# Patient Record
Sex: MALE | Race: WHITE | Employment: OTHER | URBAN - NONMETROPOLITAN AREA
[De-identification: names, ages, dates, MRNs, and addresses within clinical notes are randomized per-mention and may not be internally consistent; named-entity substitution may affect disease eponyms.]

---

## 2017-06-28 ENCOUNTER — OFFICE VISIT (OUTPATIENT)
Dept: VASCULAR SURGERY | Age: 67
End: 2017-06-28
Payer: MEDICARE

## 2017-06-28 ENCOUNTER — HOSPITAL ENCOUNTER (OUTPATIENT)
Dept: VASCULAR LAB | Age: 67
Discharge: HOME | End: 2017-06-28
Payer: MEDICARE

## 2017-06-28 VITALS
HEART RATE: 65 BPM | DIASTOLIC BLOOD PRESSURE: 70 MMHG | SYSTOLIC BLOOD PRESSURE: 118 MMHG | RESPIRATION RATE: 18 BRPM | OXYGEN SATURATION: 95 % | TEMPERATURE: 97.1 F

## 2017-06-28 DIAGNOSIS — I65.23 BILATERAL CAROTID ARTERY STENOSIS: Primary | ICD-10-CM

## 2017-06-28 DIAGNOSIS — I65.23 BILATERAL CAROTID ARTERY STENOSIS: ICD-10-CM

## 2017-06-28 PROCEDURE — 4040F PNEUMOC VAC/ADMIN/RCVD: CPT | Performed by: PHYSICIAN ASSISTANT

## 2017-06-28 PROCEDURE — 1123F ACP DISCUSS/DSCN MKR DOCD: CPT | Performed by: PHYSICIAN ASSISTANT

## 2017-06-28 PROCEDURE — G8421 BMI NOT CALCULATED: HCPCS | Performed by: PHYSICIAN ASSISTANT

## 2017-06-28 PROCEDURE — G8427 DOCREV CUR MEDS BY ELIG CLIN: HCPCS | Performed by: PHYSICIAN ASSISTANT

## 2017-06-28 PROCEDURE — 99213 OFFICE O/P EST LOW 20 MIN: CPT | Performed by: PHYSICIAN ASSISTANT

## 2017-06-28 PROCEDURE — 93880 EXTRACRANIAL BILAT STUDY: CPT

## 2017-06-28 PROCEDURE — 1036F TOBACCO NON-USER: CPT | Performed by: PHYSICIAN ASSISTANT

## 2017-06-28 PROCEDURE — G8598 ASA/ANTIPLAT THER USED: HCPCS | Performed by: PHYSICIAN ASSISTANT

## 2017-06-28 PROCEDURE — 3017F COLORECTAL CA SCREEN DOC REV: CPT | Performed by: PHYSICIAN ASSISTANT

## 2017-08-02 ENCOUNTER — TRANSCRIBE ORDERS (OUTPATIENT)
Dept: ADMINISTRATIVE | Facility: HOSPITAL | Age: 67
End: 2017-08-02

## 2017-08-02 DIAGNOSIS — J98.4 OTHER DISEASES OF LUNG, NOT ELSEWHERE CLASSIFIED: Primary | ICD-10-CM

## 2017-08-04 ENCOUNTER — HOSPITAL ENCOUNTER (OUTPATIENT)
Dept: CT IMAGING | Facility: HOSPITAL | Age: 67
Discharge: HOME | End: 2017-08-04
Attending: INTERNAL MEDICINE | Admitting: INTERNAL MEDICINE

## 2017-08-04 DIAGNOSIS — J98.4 OTHER DISEASES OF LUNG, NOT ELSEWHERE CLASSIFIED: ICD-10-CM

## 2017-08-04 PROCEDURE — 71250 CT THORAX DX C-: CPT

## 2018-07-20 DIAGNOSIS — I65.23 BILATERAL CAROTID ARTERY STENOSIS: Primary | ICD-10-CM

## 2018-07-23 ENCOUNTER — HOSPITAL ENCOUNTER (OUTPATIENT)
Dept: VASCULAR LAB | Age: 68
Discharge: HOME | End: 2018-07-23
Payer: MEDICARE

## 2018-07-23 DIAGNOSIS — I65.23 BILATERAL CAROTID ARTERY STENOSIS: ICD-10-CM

## 2018-07-23 PROCEDURE — 93880 EXTRACRANIAL BILAT STUDY: CPT

## 2018-07-24 ENCOUNTER — TELEPHONE (OUTPATIENT)
Dept: VASCULAR SURGERY | Age: 68
End: 2018-07-24

## 2018-07-24 DIAGNOSIS — I65.23 BILATERAL CAROTID ARTERY STENOSIS: Primary | ICD-10-CM

## 2018-07-24 NOTE — TELEPHONE ENCOUNTER
----- Message from Jc Jose PA-C sent at 7/24/2018  9:13 AM CDT -----  CDU unchanged, both ICA's < 50% stenotic. Repeat CDU in 12 months.

## 2019-06-13 ENCOUNTER — TRANSCRIBE ORDERS (OUTPATIENT)
Dept: PULMONOLOGY | Facility: CLINIC | Age: 69
End: 2019-06-13

## 2019-06-13 DIAGNOSIS — J98.4 SCARRING OF LUNG: Primary | ICD-10-CM

## 2019-07-31 ENCOUNTER — HOSPITAL ENCOUNTER (OUTPATIENT)
Dept: VASCULAR LAB | Age: 69
Discharge: HOME | End: 2019-07-31
Payer: MEDICARE

## 2019-07-31 ENCOUNTER — TELEPHONE (OUTPATIENT)
Dept: VASCULAR SURGERY | Age: 69
End: 2019-07-31

## 2019-07-31 DIAGNOSIS — I65.23 BILATERAL CAROTID ARTERY STENOSIS: ICD-10-CM

## 2019-07-31 DIAGNOSIS — I65.23 BILATERAL CAROTID ARTERY STENOSIS: Primary | ICD-10-CM

## 2019-07-31 PROCEDURE — 93880 EXTRACRANIAL BILAT STUDY: CPT

## 2019-07-31 NOTE — TELEPHONE ENCOUNTER
Called and spoke with Mr Ailyn Trivedi to let him know that carotid had not changed and we would see him back in one year.   Mr Ailyn Trivedi voiced understanding

## 2019-08-08 DIAGNOSIS — J98.4 SCARRING OF LUNG: ICD-10-CM

## 2019-08-22 ENCOUNTER — OFFICE VISIT (OUTPATIENT)
Dept: PULMONOLOGY | Facility: CLINIC | Age: 69
End: 2019-08-22

## 2019-08-22 VITALS
BODY MASS INDEX: 30.91 KG/M2 | OXYGEN SATURATION: 98 % | DIASTOLIC BLOOD PRESSURE: 80 MMHG | SYSTOLIC BLOOD PRESSURE: 124 MMHG | HEART RATE: 57 BPM | HEIGHT: 71 IN | WEIGHT: 220.8 LBS

## 2019-08-22 DIAGNOSIS — J98.4 SCARRING OF LUNG: Primary | ICD-10-CM

## 2019-08-22 DIAGNOSIS — E66.3 OVERWEIGHT: ICD-10-CM

## 2019-08-22 DIAGNOSIS — J98.4 RESTRICTIVE LUNG DISEASE: ICD-10-CM

## 2019-08-22 LAB
DIFF CAP.CO: NORMAL ML/MMHG SEC
FEV1/FVC: NORMAL %
FEV1: NORMAL LITERS
FVC VOL RESPIRATORY: NORMAL LITERS
TLC: NORMAL LITERS

## 2019-08-22 PROCEDURE — 94729 DIFFUSING CAPACITY: CPT | Performed by: INTERNAL MEDICINE

## 2019-08-22 PROCEDURE — 99214 OFFICE O/P EST MOD 30 MIN: CPT | Performed by: INTERNAL MEDICINE

## 2019-08-22 PROCEDURE — 94010 BREATHING CAPACITY TEST: CPT | Performed by: INTERNAL MEDICINE

## 2019-08-22 PROCEDURE — 94727 GAS DIL/WSHOT DETER LNG VOL: CPT | Performed by: INTERNAL MEDICINE

## 2019-08-22 RX ORDER — GLIPIZIDE 5 MG/1
TABLET ORAL EVERY 12 HOURS SCHEDULED
COMMUNITY

## 2019-08-22 RX ORDER — ACETAMINOPHEN 500 MG
500 TABLET ORAL
COMMUNITY

## 2019-08-22 RX ORDER — CHOLECALCIFEROL (VITAMIN D3) 25 MCG
CAPSULE ORAL
COMMUNITY

## 2019-08-22 RX ORDER — LEVOTHYROXINE SODIUM 0.07 MG/1
75 TABLET ORAL DAILY
Refills: 4 | COMMUNITY
Start: 2019-08-01

## 2019-08-22 RX ORDER — METOPROLOL TARTRATE 50 MG/1
TABLET, FILM COATED ORAL 2 TIMES DAILY
COMMUNITY

## 2019-08-22 NOTE — PATIENT INSTRUCTIONS
The patient is stable clinically and I advised the patient his wife that his CT was stable as well as were his pulmonary functions.  We will see him back in follow-up in 1 year with complete PFTs on return.  I do not think we need to do a routine follow-up CT.  If he had worsening symptoms worsening pulmonary functions in the future then we could get a follow-up CT.  I do suspect his lung scarring relates to previous environmental exposure.

## 2020-08-04 ENCOUNTER — HOSPITAL ENCOUNTER (OUTPATIENT)
Dept: VASCULAR LAB | Age: 70
Discharge: HOME | End: 2020-08-04
Payer: MEDICARE

## 2020-08-04 PROCEDURE — 93880 EXTRACRANIAL BILAT STUDY: CPT

## 2020-08-05 ENCOUNTER — VIRTUAL VISIT (OUTPATIENT)
Dept: VASCULAR SURGERY | Age: 70
End: 2020-08-05
Payer: MEDICARE

## 2020-08-05 PROCEDURE — 99441 PR PHYS/QHP TELEPHONE EVALUATION 5-10 MIN: CPT | Performed by: PHYSICIAN ASSISTANT

## 2020-08-05 NOTE — PROGRESS NOTES
by mouth daily       No current facility-administered medications for this visit. Allergies: Hydrocodone-acetaminophen and Tape [adhesive tape]  Past Medical History:   Diagnosis Date    Acute pain of right hip     Arthritis     Cancer ([facility] [address] 1998    MELANOMA REMOVED FROM LYMPH NODE IN RIGHT JAW    Carotid artery stenosis     Diabetes ([facility] [address]     TYPE 2    Diabetes mellitus ([facility] [address]     Hyperlipidemia     Hypertension     Osteoarthritis     Restless legs syndrome     Thyroid disease      Past Surgical History:   Procedure Laterality Date    BACK SURGERY  2013    LUMBAR/LASER CLEAN UP RUPTURED DISC    CAROTID ENDARTERECTOMY Right 8/5/15 P[facility]    JOINT REPLACEMENT Left 2010    THR    OTHER SURGICAL HISTORY      radiation and neck dissection for melanoma    TOTAL HIP ARTHROPLASTY Right 8/22/2016    HIP TOTAL ARTHROPLASTY ANTERIOR APPROACH performed by Fidencio Young [facility] at [facility] OR     Family History   Problem Relation Age of Onset    No Known Problems Mother     No Known Problems Father     Cancer Brother         LUNG CA    Mental Retardation Brother         PARANOID SCHIZOPHRENIA     Social History     Tobacco Use    Smoking status: Never Smoker   Substance Use Topics    Alcohol use: No     Comment: HX OF ETOH ABUSE       Mr. Sean Huitron is a 70 yo female who has a history of Carotid Artery Stenosis. Today we are following up for his carotid artery stenosis. He is on ASA and fish oil. He reports that he has tried Lipitor in the past and had to stop it due to myalgias. Patient denies any new onset of partial or complete loss of vision affecting only one eye, speech difficulty or lateralizing weakness, numbness/tingling. He does not smoke.        CDU -   Impression          Well healed right carotid endarterectomy site with no recurrent stenosis     or residual plaque.     There is heterogeneous plaque visualized in the left internal carotid     artery(ies).     There is less than 50% stenosis of the left the right side.           - Additional Measurements:ICAPSV/CCAPSV 0.75. ICAEDV/CCAEDV 1.08.         Carotid Left Measurements    +------------+-------+-------+--------+-------+------------+---------------+    ! Location    !PSV    !EDV    ! Angle   !RI     !%Stenosis   ! Tortuosity     !    +------------+-------+-------+--------+-------+------------+---------------+    ! Prox CCA    !62.1   !12.9   !60      !0.79   !            !               !    +------------+-------+-------+--------+-------+------------+---------------+    ! Mid CCA     !79.2   !17.6   !60      !0.78   !            !               !    +------------+-------+-------+--------+-------+------------+---------------+    ! Prox ICA    !86.2   !28.1   !60      !0.67   !            !               !    +------------+-------+-------+--------+-------+------------+---------------+    ! Mid ICA     !111    !34     !60      !0.69   !            !               !    +------------+-------+-------+--------+-------+------------+---------------+    ! Dist ICA    !74.5   !28.1   !60      !0.62   !            !               !    +------------+-------+-------+--------+-------+------------+---------------+    ! Prox ECA    !94.4   !8.21   !60      !0.91   !            !               !    +------------+-------+-------+--------+-------+------------+---------------+    ! Vertebral   !49.3   !13.1   !60      !0.73   !            !               !    +------------+-------+-------+--------+-------+------------+---------------+           - There is antegrade vertebral flow noted on the left side.           - Additional Measurements:ICAPSV/CCAPSV 1.79. ICAEDV/CCAEDV 2.64.             I affirm this is a Patient Initiated Episode with a Patient who has not had a related appointment within my department in the past 7 days or scheduled within the next 24 hours.     Patient identification was verified at the start of the visit: Yes    Total Time: minutes: 5-10 minutes    Note: not billable if this call serves to triage the patient into an appointment for the relevant concern      Agustin Alejo

## 2020-09-02 NOTE — PROGRESS NOTES
JUAN MANUEL Childers  [facility]   Respiratory Disease Clinic  [address] [place] [address]  Phone: [phone]  Fax: [phone]     Eric Maloney is a 70 y.o. male.   CC:   Chief Complaint   Patient presents with   • Scarring of lung     no hospitalizations; no exposure; no testing        HPI:  Mr. Maloney is a pleasant 70 year old male with known scarring of lung, restrictive lung disease, and asbestos exposure. He also [place]. When seen by Dr. Stanford last year, he was not found to have an IPF picture on previous imaging. He does have thickening of the fissures noted on previous scans. Last CT last year did not show any progression. He was advised he could use his inhalers as needed if they help. He is not currently using  Inhalers.He was sked to return in one year with repeat complete PFTs however given the pandemic, those have not been performed.  He is a former smoker of quitting 40 years ago.      Patient denies fever, chills, cough, shortness of breath or difficulty breathing, fatigue, muscle or body aches, new onset headache, new loss of taste or smell, sore throat, congestion or runny nose, nausea or vomiting, diarrhea.  Patient denies any known exposure to a person under investigation or positive for COVID-19.  Patient is wearing mask today.       The following portions of the patient's history were reviewed and updated as appropriate: allergies, current medications, past family history, past medical history, past social history, past surgical history and problem list.    Past Medical History:   Diagnosis Date   • Carotid artery stenosis    • Diabetes mellitus (CMS/HCC)    • Disease of thyroid gland    • Parotid neoplasm     right   • Skin cancer        Family History   Problem Relation Age of Onset   • Heart disease Other    • Hypertension Other    • Cancer Other    • Diabetes Other        Social History     Socioeconomic History   • Marital status:   "    Spouse name: Not on file   • Number of children: Not on file   • Years of education: Not on file   • Highest education level: Not on file   Tobacco Use   • Smoking status: Former Smoker     Packs/day: 2.00     Years: 3.00     Pack years: 6.00     Types: Cigarettes     Last attempt to quit: 1970     Years since quittin.7   • Smokeless tobacco: Never Used   Substance and Sexual Activity   • Alcohol use: No     Frequency: Never   • Drug use: Defer   • Sexual activity: Defer       Review of Systems   Constitutional: Negative for chills, fatigue and fever.   HENT: Negative for congestion, postnasal drip and sore throat.    Eyes: Negative for blurred vision, double vision and visual disturbance.   Respiratory: Negative for cough, shortness of breath and wheezing.    Cardiovascular: Negative for chest pain, palpitations and leg swelling.   Gastrointestinal: Negative for diarrhea, nausea and vomiting.   Endocrine: Negative for cold intolerance, heat intolerance and polydipsia.   Musculoskeletal: Negative for back pain, gait problem and myalgias.   Skin: Negative for color change, pallor and rash.   Allergic/Immunologic: Negative for environmental allergies, food allergies and immunocompromised state.   Neurological: Negative for dizziness, syncope and confusion.   Hematological: Negative for adenopathy. Does not bruise/bleed easily.   Psychiatric/Behavioral: Negative for agitation, behavioral problems and sleep disturbance.       /80   Pulse 58   Temp 98.1 °F (36.7 °C)   Ht 180.3 cm (71\")   Wt 107 kg (235 lb)   SpO2 97% Comment: RA  BMI 32.78 kg/m²     Physical Exam   Constitutional: He is oriented to person, place, and time. He appears well-developed and well-nourished. No distress.   HENT:   Head: Normocephalic and atraumatic.   Eyes: Pupils are equal, round, and reactive to light. Conjunctivae are normal.   Neck: Normal range of motion. Neck supple.   Cardiovascular: Normal rate, regular rhythm and " normal heart sounds. Exam reveals no gallop and no friction rub.   No murmur heard.  Pulmonary/Chest: Effort normal and breath sounds normal. He has no wheezes.   Abdominal: Soft. Bowel sounds are normal.   Musculoskeletal: He exhibits no edema or deformity.   Lymphadenopathy:     He has no cervical adenopathy.   Neurological: He is oriented to person, place, and time.   Skin: Skin is warm and dry. He is not diaphoretic.   Psychiatric: He has a normal mood and affect. His behavior is normal. Judgment and thought content normal.       Pulmonary Functions Testing Results:    PFT Values        Some values may be hidden. Unless noted otherwise, only the newest values recorded on each date are displayed.         Old Values PFT Results 8/22/19   FVC 78%   FEV1 75%   FEV1/FVC 76.04%   DLCO 86%   TLC 76%      Pre Drug PFT Results 8/22/19   No data to display.      Post Drug PFT Results 8/22/19   No data to display.      Other Tests PFT Results 8/22/19   No data to display.           Results for orders placed in visit on 08/22/19   Pulmonary Function Test       My interpretation: none    CXR: none        Problem List Items Addressed This Visit        Respiratory    Scarring of lung - Primary    Restrictive lung disease       Other    Overweight        Patient's Body mass index is 32.78 kg/m². BMI is above normal parameters. Recommendations include: referral to primary care.      Assessment/Plan        Patient is stable from a pulmonary standpoint. He will follow up with Dr. Stanford in one year.     Steffanie Garrett, JUAN MANUEL  9/4/2020  14:25    No follow-ups on file.    This dictation was generated by voice recognition computer software. Although all attempts are made to edit dictation for accuracy, there may be errors in the transcription that are not intended.

## 2020-09-04 ENCOUNTER — OFFICE VISIT (OUTPATIENT)
Dept: PULMONOLOGY | Facility: CLINIC | Age: 70
End: 2020-09-04

## 2020-09-04 VITALS
BODY MASS INDEX: 32.9 KG/M2 | TEMPERATURE: 98.1 F | WEIGHT: 235 LBS | SYSTOLIC BLOOD PRESSURE: 150 MMHG | DIASTOLIC BLOOD PRESSURE: 80 MMHG | HEIGHT: 71 IN | HEART RATE: 58 BPM | OXYGEN SATURATION: 97 %

## 2020-09-04 DIAGNOSIS — J98.4 RESTRICTIVE LUNG DISEASE: ICD-10-CM

## 2020-09-04 DIAGNOSIS — E66.3 OVERWEIGHT: ICD-10-CM

## 2020-09-04 DIAGNOSIS — J98.4 SCARRING OF LUNG: Primary | ICD-10-CM

## 2020-09-04 PROCEDURE — 99213 OFFICE O/P EST LOW 20 MIN: CPT | Performed by: NURSE PRACTITIONER

## 2020-09-04 RX ORDER — PRAVASTATIN SODIUM 40 MG
TABLET ORAL
COMMUNITY

## 2020-11-03 PROBLEM — M19.90 OSTEOARTHRITIS: Status: RESOLVED | Noted: 2020-11-03 | Resolved: 2020-11-03

## 2021-03-05 ENCOUNTER — IMMUNIZATION (OUTPATIENT)
Age: 71
End: 2021-03-05
Payer: MEDICARE

## 2021-03-05 PROCEDURE — 91300 COVID-19, PFIZER VACCINE 30MCG/0.3ML DOSE: CPT | Performed by: FAMILY MEDICINE

## 2021-03-05 PROCEDURE — 0001A COVID-19, PFIZER VACCINE 30MCG/0.3ML DOSE: CPT | Performed by: FAMILY MEDICINE

## 2021-03-26 ENCOUNTER — IMMUNIZATION (OUTPATIENT)
Age: 71
End: 2021-03-26
Payer: MEDICARE

## 2021-03-26 PROCEDURE — 91300 COVID-19, PFIZER VACCINE 30MCG/0.3ML DOSE: CPT | Performed by: FAMILY MEDICINE

## 2021-03-26 PROCEDURE — 0002A PR IMM ADMN SARSCOV2 30MCG/0.3ML DIL RECON 2ND DOSE: CPT | Performed by: FAMILY MEDICINE

## 2021-04-15 ENCOUNTER — HOSPITAL ENCOUNTER (OUTPATIENT)
Dept: PREADMISSION TESTING | Age: 71
Discharge: HOME | End: 2021-04-19
Payer: MEDICARE

## 2021-04-15 ENCOUNTER — HOSPITAL ENCOUNTER (OUTPATIENT)
Dept: GENERAL RADIOLOGY | Age: 71
Discharge: HOME | End: 2021-04-15
Payer: MEDICARE

## 2021-04-15 VITALS — WEIGHT: 238 LBS | BODY MASS INDEX: 33.32 KG/M2 | HEIGHT: 71 IN

## 2021-04-15 LAB
ABO/RH: NORMAL
ALBUMIN SERPL-MCNC: 4.1 G/DL (ref 3.5–5.2)
ALP BLD-CCNC: 51 U/L (ref 40–130)
ALT SERPL-CCNC: 21 U/L (ref 5–41)
ANION GAP SERPL CALCULATED.3IONS-SCNC: 9 MMOL/L (ref 7–19)
ANTIBODY SCREEN: NORMAL
APTT: 26.6 SEC (ref 26–36.2)
AST SERPL-CCNC: 21 U/L (ref 5–40)
BASOPHILS ABSOLUTE: 0.1 K/UL (ref 0–0.2)
BASOPHILS RELATIVE PERCENT: 0.8 % (ref 0–1)
BILIRUB SERPL-MCNC: 0.4 MG/DL (ref 0.2–1.2)
BILIRUBIN URINE: NEGATIVE
BLOOD, URINE: NEGATIVE
BUN BLDV-MCNC: 12 MG/DL (ref 8–23)
CALCIUM SERPL-MCNC: 8.9 MG/DL (ref 8.8–10.2)
CHLORIDE BLD-SCNC: 104 MMOL/L (ref 98–111)
CLARITY: CLEAR
CO2: 28 MMOL/L (ref 22–29)
COLOR: YELLOW
CREAT SERPL-MCNC: 0.6 MG/DL (ref 0.5–1.2)
EOSINOPHILS ABSOLUTE: 0.5 K/UL (ref 0–0.6)
EOSINOPHILS RELATIVE PERCENT: 8.6 % (ref 0–5)
GFR AFRICAN AMERICAN: >59
GFR NON-AFRICAN AMERICAN: >60
GLUCOSE BLD-MCNC: 146 MG/DL (ref 74–109)
GLUCOSE URINE: NEGATIVE MG/DL
HBA1C MFR BLD: 5.7 % (ref 4–6)
HCT VFR BLD CALC: 45.9 % (ref 42–52)
HEMOGLOBIN: 15 G/DL (ref 14–18)
IMMATURE GRANULOCYTES #: 0 K/UL
INR BLD: 1.04 (ref 0.88–1.18)
KETONES, URINE: NEGATIVE MG/DL
LEUKOCYTE ESTERASE, URINE: NEGATIVE
LYMPHOCYTES ABSOLUTE: 2 K/UL (ref 1.1–4.5)
LYMPHOCYTES RELATIVE PERCENT: 33.4 % (ref 20–40)
MCH RBC QN AUTO: 33.9 PG (ref 27–31)
MCHC RBC AUTO-ENTMCNC: 32.7 G/DL (ref 33–37)
MCV RBC AUTO: 103.6 FL (ref 80–94)
MONOCYTES ABSOLUTE: 0.7 K/UL (ref 0–0.9)
MONOCYTES RELATIVE PERCENT: 11 % (ref 0–10)
MRSA SCREEN RT-PCR: NOT DETECTED
NEUTROPHILS ABSOLUTE: 2.7 K/UL (ref 1.5–7.5)
NEUTROPHILS RELATIVE PERCENT: 45.7 % (ref 50–65)
NITRITE, URINE: NEGATIVE
PDW BLD-RTO: 12.7 % (ref 11.5–14.5)
PH UA: 6.5 (ref 5–8)
PLATELET # BLD: 164 K/UL (ref 130–400)
PMV BLD AUTO: 11.4 FL (ref 9.4–12.4)
POTASSIUM SERPL-SCNC: 4.7 MMOL/L (ref 3.5–5)
PROTEIN UA: NEGATIVE MG/DL
PROTHROMBIN TIME: 13.5 SEC (ref 12–14.6)
RBC # BLD: 4.43 M/UL (ref 4.7–6.1)
SODIUM BLD-SCNC: 141 MMOL/L (ref 136–145)
SPECIFIC GRAVITY UA: 1.02 (ref 1–1.03)
TOTAL PROTEIN: 6.5 G/DL (ref 6.6–8.7)
UROBILINOGEN, URINE: 0.2 E.U./DL
WBC # BLD: 5.9 K/UL (ref 4.8–10.8)

## 2021-04-15 PROCEDURE — 81003 URINALYSIS AUTO W/O SCOPE: CPT

## 2021-04-15 PROCEDURE — 85730 THROMBOPLASTIN TIME PARTIAL: CPT

## 2021-04-15 PROCEDURE — 85025 COMPLETE CBC W/AUTO DIFF WBC: CPT

## 2021-04-15 PROCEDURE — 71046 X-RAY EXAM CHEST 2 VIEWS: CPT

## 2021-04-15 PROCEDURE — 86850 RBC ANTIBODY SCREEN: CPT

## 2021-04-15 PROCEDURE — 87641 MR-STAPH DNA AMP PROBE: CPT

## 2021-04-15 PROCEDURE — 86900 BLOOD TYPING SEROLOGIC ABO: CPT

## 2021-04-15 PROCEDURE — 80053 COMPREHEN METABOLIC PANEL: CPT

## 2021-04-15 PROCEDURE — 85610 PROTHROMBIN TIME: CPT

## 2021-04-15 PROCEDURE — 83036 HEMOGLOBIN GLYCOSYLATED A1C: CPT

## 2021-04-15 PROCEDURE — 86901 BLOOD TYPING SEROLOGIC RH(D): CPT

## 2021-04-15 PROCEDURE — 93005 ELECTROCARDIOGRAM TRACING: CPT

## 2021-04-15 RX ORDER — CELECOXIB 200 MG/1
200 CAPSULE ORAL ONCE
Status: CANCELLED | OUTPATIENT
Start: 2021-05-03

## 2021-04-15 RX ORDER — DEXAMETHASONE SODIUM PHOSPHATE 10 MG/ML
10 INJECTION, SOLUTION INTRAMUSCULAR; INTRAVENOUS ONCE
Status: CANCELLED | OUTPATIENT
Start: 2021-05-03

## 2021-04-15 RX ORDER — CLINDAMYCIN PHOSPHATE 900 MG/50ML
900 INJECTION INTRAVENOUS ONCE
Status: CANCELLED | OUTPATIENT
Start: 2021-05-03

## 2021-04-15 RX ORDER — LEVOTHYROXINE SODIUM 0.1 MG/1
100 TABLET ORAL DAILY
COMMUNITY

## 2021-04-15 RX ORDER — METOPROLOL TARTRATE 50 MG/1
50 TABLET, FILM COATED ORAL 2 TIMES DAILY
COMMUNITY

## 2021-04-15 RX ORDER — ACETAMINOPHEN 500 MG
1000 TABLET ORAL ONCE
Status: CANCELLED | OUTPATIENT
Start: 2021-05-03

## 2021-04-15 RX ORDER — PREGABALIN 75 MG/1
75 CAPSULE ORAL ONCE
Status: CANCELLED | OUTPATIENT
Start: 2021-05-03

## 2021-04-20 LAB
EKG P AXIS: 38 DEGREES
EKG P-R INTERVAL: 186 MS
EKG Q-T INTERVAL: 466 MS
EKG QRS DURATION: 110 MS
EKG QTC CALCULATION (BAZETT): 456 MS
EKG T AXIS: -19 DEGREES

## 2021-04-30 ENCOUNTER — HOSPITAL ENCOUNTER (OUTPATIENT)
Dept: PREADMISSION TESTING | Age: 71
Discharge: HOME | End: 2021-04-30

## 2021-06-10 NOTE — PROGRESS NOTES
Dr. Geena Rodriguez reviewed patient's EKG done on 4/15/21 and stated patient is ok to proceed with anesthesia for surgery.

## 2021-06-15 ENCOUNTER — HOSPITAL ENCOUNTER (OUTPATIENT)
Dept: PREADMISSION TESTING | Age: 71
Discharge: HOME | End: 2021-06-19
Payer: MEDICARE

## 2021-06-15 VITALS — BODY MASS INDEX: 32.2 KG/M2 | WEIGHT: 230 LBS | HEIGHT: 71 IN

## 2021-06-15 NOTE — PROGRESS NOTES
Pt was seen recent in preop, April 2021, and was rescheduled due to family illness. Pt states he still has his soap and his avs/discharge info and total packet. New avs not given this preop visit. Office notified that preop tests still within date. No repeats required per kalina at office.

## 2021-06-21 ENCOUNTER — ANESTHESIA (OUTPATIENT)
Dept: OPERATING ROOM | Age: 71
DRG: 470 | End: 2021-06-21
Payer: MEDICARE

## 2021-06-21 ENCOUNTER — HOSPITAL ENCOUNTER (INPATIENT)
Age: 71
LOS: 2 days | Discharge: HOME HEALTH | DRG: 470 | End: 2021-06-23
Attending: ORTHOPAEDIC SURGERY | Admitting: ORTHOPAEDIC SURGERY
Payer: MEDICARE

## 2021-06-21 ENCOUNTER — ANESTHESIA EVENT (OUTPATIENT)
Dept: OPERATING ROOM | Age: 71
DRG: 470 | End: 2021-06-21
Payer: MEDICARE

## 2021-06-21 ENCOUNTER — APPOINTMENT (OUTPATIENT)
Dept: GENERAL RADIOLOGY | Age: 71
DRG: 470 | End: 2021-06-21
Attending: ORTHOPAEDIC SURGERY
Payer: MEDICARE

## 2021-06-21 VITALS
TEMPERATURE: 97 F | DIASTOLIC BLOOD PRESSURE: 71 MMHG | RESPIRATION RATE: 11 BRPM | OXYGEN SATURATION: 98 % | SYSTOLIC BLOOD PRESSURE: 124 MMHG

## 2021-06-21 DIAGNOSIS — M17.12 PRIMARY OSTEOARTHRITIS OF LEFT KNEE: Primary | ICD-10-CM

## 2021-06-21 PROBLEM — M17.10 ARTHRITIS OF KNEE: Status: ACTIVE | Noted: 2021-06-21

## 2021-06-21 LAB
GLUCOSE BLD-MCNC: 143 MG/DL (ref 70–99)
GLUCOSE BLD-MCNC: 181 MG/DL (ref 70–99)
GLUCOSE BLD-MCNC: 241 MG/DL (ref 70–99)
GLUCOSE BLD-MCNC: 257 MG/DL (ref 70–99)
PERFORMED ON: ABNORMAL

## 2021-06-21 PROCEDURE — 6360000002 HC RX W HCPCS: Performed by: ANESTHESIOLOGY

## 2021-06-21 PROCEDURE — 3E0T3BZ INTRODUCTION OF ANESTHETIC AGENT INTO PERIPHERAL NERVES AND PLEXI, PERCUTANEOUS APPROACH: ICD-10-PCS | Performed by: ANESTHESIOLOGY

## 2021-06-21 PROCEDURE — C9290 INJ, BUPIVACAINE LIPOSOME: HCPCS | Performed by: ORTHOPAEDIC SURGERY

## 2021-06-21 PROCEDURE — 2500000003 HC RX 250 WO HCPCS: Performed by: NURSE ANESTHETIST, CERTIFIED REGISTERED

## 2021-06-21 PROCEDURE — C1713 ANCHOR/SCREW BN/BN,TIS/BN: HCPCS | Performed by: ORTHOPAEDIC SURGERY

## 2021-06-21 PROCEDURE — 3600000005 HC SURGERY LEVEL 5 BASE: Performed by: ORTHOPAEDIC SURGERY

## 2021-06-21 PROCEDURE — 6360000002 HC RX W HCPCS: Performed by: ORTHOPAEDIC SURGERY

## 2021-06-21 PROCEDURE — 0SRD0J9 REPLACEMENT OF LEFT KNEE JOINT WITH SYNTHETIC SUBSTITUTE, CEMENTED, OPEN APPROACH: ICD-10-PCS | Performed by: ORTHOPAEDIC SURGERY

## 2021-06-21 PROCEDURE — 2580000003 HC RX 258: Performed by: NURSE ANESTHETIST, CERTIFIED REGISTERED

## 2021-06-21 PROCEDURE — 6370000000 HC RX 637 (ALT 250 FOR IP): Performed by: ORTHOPAEDIC SURGERY

## 2021-06-21 PROCEDURE — 2500000003 HC RX 250 WO HCPCS: Performed by: ORTHOPAEDIC SURGERY

## 2021-06-21 PROCEDURE — 2709999900 HC NON-CHARGEABLE SUPPLY: Performed by: ORTHOPAEDIC SURGERY

## 2021-06-21 PROCEDURE — 7100000001 HC PACU RECOVERY - ADDTL 15 MIN: Performed by: ORTHOPAEDIC SURGERY

## 2021-06-21 PROCEDURE — 64447 NJX AA&/STRD FEMORAL NRV IMG: CPT | Performed by: NURSE ANESTHETIST, CERTIFIED REGISTERED

## 2021-06-21 PROCEDURE — 3700000001 HC ADD 15 MINUTES (ANESTHESIA): Performed by: ORTHOPAEDIC SURGERY

## 2021-06-21 PROCEDURE — C1776 JOINT DEVICE (IMPLANTABLE): HCPCS | Performed by: ORTHOPAEDIC SURGERY

## 2021-06-21 PROCEDURE — 3700000000 HC ANESTHESIA ATTENDED CARE: Performed by: ORTHOPAEDIC SURGERY

## 2021-06-21 PROCEDURE — 1210000000 HC MED SURG R&B

## 2021-06-21 PROCEDURE — 2580000003 HC RX 258: Performed by: ORTHOPAEDIC SURGERY

## 2021-06-21 PROCEDURE — 2720000010 HC SURG SUPPLY STERILE: Performed by: ORTHOPAEDIC SURGERY

## 2021-06-21 PROCEDURE — 2700000000 HC OXYGEN THERAPY PER DAY

## 2021-06-21 PROCEDURE — 82947 ASSAY GLUCOSE BLOOD QUANT: CPT

## 2021-06-21 PROCEDURE — 7100000000 HC PACU RECOVERY - FIRST 15 MIN: Performed by: ORTHOPAEDIC SURGERY

## 2021-06-21 PROCEDURE — 73560 X-RAY EXAM OF KNEE 1 OR 2: CPT

## 2021-06-21 PROCEDURE — 2580000003 HC RX 258: Performed by: ANESTHESIOLOGY

## 2021-06-21 PROCEDURE — 3600000015 HC SURGERY LEVEL 5 ADDTL 15MIN: Performed by: ORTHOPAEDIC SURGERY

## 2021-06-21 PROCEDURE — 6360000002 HC RX W HCPCS: Performed by: NURSE ANESTHETIST, CERTIFIED REGISTERED

## 2021-06-21 DEVICE — IMPL KNEE PSN ALL POLY PAT 38MM: Type: IMPLANTABLE DEVICE | Site: KNEE | Status: FUNCTIONAL

## 2021-06-21 DEVICE — Z  INACTIVE USE 2750024 CEMENT BONE 40GM W/ GENTMYCN HI VISC PALACOS R+G: Type: IMPLANTABLE DEVICE | Site: KNEE | Status: FUNCTIONAL

## 2021-06-21 DEVICE — PSN TIB POR 2 PEG SZ G L: Type: IMPLANTABLE DEVICE | Site: KNEE | Status: FUNCTIONAL

## 2021-06-21 DEVICE — PSN FEM CR POR CCR STD SZ10 L: Type: IMPLANTABLE DEVICE | Site: KNEE | Status: FUNCTIONAL

## 2021-06-21 DEVICE — PSN MC VE ASF L 10MM 8-11 GH: Type: IMPLANTABLE DEVICE | Site: KNEE | Status: FUNCTIONAL

## 2021-06-21 RX ORDER — VANCOMYCIN HYDROCHLORIDE 1 G/20ML
INJECTION, POWDER, LYOPHILIZED, FOR SOLUTION INTRAVENOUS PRN
Status: DISCONTINUED | OUTPATIENT
Start: 2021-06-21 | End: 2021-06-21 | Stop reason: SDUPTHER

## 2021-06-21 RX ORDER — HYDROMORPHONE HYDROCHLORIDE 1 MG/ML
1 INJECTION, SOLUTION INTRAMUSCULAR; INTRAVENOUS; SUBCUTANEOUS
Status: DISCONTINUED | OUTPATIENT
Start: 2021-06-21 | End: 2021-06-21

## 2021-06-21 RX ORDER — PROPOFOL 10 MG/ML
INJECTION, EMULSION INTRAVENOUS PRN
Status: DISCONTINUED | OUTPATIENT
Start: 2021-06-21 | End: 2021-06-21 | Stop reason: SDUPTHER

## 2021-06-21 RX ORDER — DIMENHYDRINATE 50 MG
200 TABLET ORAL DAILY
Status: DISCONTINUED | OUTPATIENT
Start: 2021-06-21 | End: 2021-06-23 | Stop reason: HOSPADM

## 2021-06-21 RX ORDER — METFORMIN HYDROCHLORIDE 500 MG/1
500 TABLET, EXTENDED RELEASE ORAL 2 TIMES DAILY WITH MEALS
Status: DISCONTINUED | OUTPATIENT
Start: 2021-06-21 | End: 2021-06-23 | Stop reason: HOSPADM

## 2021-06-21 RX ORDER — ACETAMINOPHEN 325 MG/1
650 TABLET ORAL EVERY 6 HOURS
Status: DISCONTINUED | OUTPATIENT
Start: 2021-06-21 | End: 2021-06-23 | Stop reason: HOSPADM

## 2021-06-21 RX ORDER — ROPIVACAINE HYDROCHLORIDE 5 MG/ML
INJECTION, SOLUTION EPIDURAL; INFILTRATION; PERINEURAL
Status: COMPLETED | OUTPATIENT
Start: 2021-06-21 | End: 2021-06-21

## 2021-06-21 RX ORDER — TRANEXAMIC ACID 100 MG/ML
INJECTION, SOLUTION INTRAVENOUS PRN
Status: DISCONTINUED | OUTPATIENT
Start: 2021-06-21 | End: 2021-06-21 | Stop reason: SDUPTHER

## 2021-06-21 RX ORDER — SODIUM CHLORIDE 0.9 % (FLUSH) 0.9 %
10 SYRINGE (ML) INJECTION PRN
Status: DISCONTINUED | OUTPATIENT
Start: 2021-06-21 | End: 2021-06-23 | Stop reason: HOSPADM

## 2021-06-21 RX ORDER — HYDROMORPHONE HYDROCHLORIDE 1 MG/ML
0.25 INJECTION, SOLUTION INTRAMUSCULAR; INTRAVENOUS; SUBCUTANEOUS EVERY 5 MIN PRN
Status: DISCONTINUED | OUTPATIENT
Start: 2021-06-21 | End: 2021-06-21 | Stop reason: HOSPADM

## 2021-06-21 RX ORDER — SODIUM CHLORIDE, SODIUM LACTATE, POTASSIUM CHLORIDE, CALCIUM CHLORIDE 600; 310; 30; 20 MG/100ML; MG/100ML; MG/100ML; MG/100ML
INJECTION, SOLUTION INTRAVENOUS CONTINUOUS PRN
Status: DISCONTINUED | OUTPATIENT
Start: 2021-06-21 | End: 2021-06-21 | Stop reason: SDUPTHER

## 2021-06-21 RX ORDER — ACETAMINOPHEN 500 MG
1000 TABLET ORAL ONCE
Status: COMPLETED | OUTPATIENT
Start: 2021-06-21 | End: 2021-06-21

## 2021-06-21 RX ORDER — SODIUM CHLORIDE 9 MG/ML
25 INJECTION, SOLUTION INTRAVENOUS PRN
Status: DISCONTINUED | OUTPATIENT
Start: 2021-06-21 | End: 2021-06-21 | Stop reason: HOSPADM

## 2021-06-21 RX ORDER — SODIUM CHLORIDE 0.9 % (FLUSH) 0.9 %
10 SYRINGE (ML) INJECTION PRN
Status: DISCONTINUED | OUTPATIENT
Start: 2021-06-21 | End: 2021-06-21 | Stop reason: HOSPADM

## 2021-06-21 RX ORDER — CLINDAMYCIN PHOSPHATE 900 MG/50ML
900 INJECTION INTRAVENOUS ONCE
Status: DISCONTINUED | OUTPATIENT
Start: 2021-06-21 | End: 2021-06-21

## 2021-06-21 RX ORDER — DEXTROSE MONOHYDRATE 25 G/50ML
12.5 INJECTION, SOLUTION INTRAVENOUS PRN
Status: DISCONTINUED | OUTPATIENT
Start: 2021-06-21 | End: 2021-06-23 | Stop reason: HOSPADM

## 2021-06-21 RX ORDER — OXYCODONE HCL 10 MG/1
10 TABLET, FILM COATED, EXTENDED RELEASE ORAL EVERY 12 HOURS SCHEDULED
Status: DISCONTINUED | OUTPATIENT
Start: 2021-06-21 | End: 2021-06-23 | Stop reason: HOSPADM

## 2021-06-21 RX ORDER — 0.9 % SODIUM CHLORIDE 0.9 %
500 INTRAVENOUS SOLUTION INTRAVENOUS PRN
Status: DISCONTINUED | OUTPATIENT
Start: 2021-06-21 | End: 2021-06-23 | Stop reason: HOSPADM

## 2021-06-21 RX ORDER — SENNA AND DOCUSATE SODIUM 50; 8.6 MG/1; MG/1
1 TABLET, FILM COATED ORAL 2 TIMES DAILY
Status: DISCONTINUED | OUTPATIENT
Start: 2021-06-21 | End: 2021-06-23 | Stop reason: HOSPADM

## 2021-06-21 RX ORDER — GLIPIZIDE 5 MG/1
5 TABLET ORAL
Status: DISCONTINUED | OUTPATIENT
Start: 2021-06-21 | End: 2021-06-23 | Stop reason: HOSPADM

## 2021-06-21 RX ORDER — METOPROLOL TARTRATE 50 MG/1
50 TABLET, FILM COATED ORAL 2 TIMES DAILY
Status: DISCONTINUED | OUTPATIENT
Start: 2021-06-21 | End: 2021-06-23 | Stop reason: HOSPADM

## 2021-06-21 RX ORDER — FENTANYL CITRATE 50 UG/ML
100 INJECTION, SOLUTION INTRAMUSCULAR; INTRAVENOUS ONCE
Status: COMPLETED | OUTPATIENT
Start: 2021-06-21 | End: 2021-06-21

## 2021-06-21 RX ORDER — LIDOCAINE HYDROCHLORIDE 10 MG/ML
INJECTION, SOLUTION INFILTRATION; PERINEURAL PRN
Status: DISCONTINUED | OUTPATIENT
Start: 2021-06-21 | End: 2021-06-21 | Stop reason: SDUPTHER

## 2021-06-21 RX ORDER — GLIPIZIDE 5 MG/1
5 TABLET, FILM COATED, EXTENDED RELEASE ORAL DAILY
Status: DISCONTINUED | OUTPATIENT
Start: 2021-06-21 | End: 2021-06-21 | Stop reason: CLARIF

## 2021-06-21 RX ORDER — DIPHENHYDRAMINE HCL 25 MG
25 TABLET ORAL NIGHTLY PRN
Status: DISCONTINUED | OUTPATIENT
Start: 2021-06-21 | End: 2021-06-23 | Stop reason: HOSPADM

## 2021-06-21 RX ORDER — PREGABALIN 75 MG/1
75 CAPSULE ORAL ONCE
Status: COMPLETED | OUTPATIENT
Start: 2021-06-21 | End: 2021-06-21

## 2021-06-21 RX ORDER — DIPHENHYDRAMINE HCL 25 MG
25 TABLET ORAL EVERY 6 HOURS PRN
Status: DISCONTINUED | OUTPATIENT
Start: 2021-06-21 | End: 2021-06-23 | Stop reason: HOSPADM

## 2021-06-21 RX ORDER — LEVOTHYROXINE SODIUM 0.1 MG/1
100 TABLET ORAL DAILY
Status: DISCONTINUED | OUTPATIENT
Start: 2021-06-22 | End: 2021-06-23 | Stop reason: HOSPADM

## 2021-06-21 RX ORDER — DEXAMETHASONE SODIUM PHOSPHATE 10 MG/ML
10 INJECTION, SOLUTION INTRAMUSCULAR; INTRAVENOUS ONCE
Status: DISCONTINUED | OUTPATIENT
Start: 2021-06-21 | End: 2021-06-21 | Stop reason: HOSPADM

## 2021-06-21 RX ORDER — DEXAMETHASONE SODIUM PHOSPHATE 10 MG/ML
10 INJECTION, SOLUTION INTRAMUSCULAR; INTRAVENOUS ONCE
Status: DISCONTINUED | OUTPATIENT
Start: 2021-06-21 | End: 2021-06-21

## 2021-06-21 RX ORDER — HYDROMORPHONE HYDROCHLORIDE 1 MG/ML
0.5 INJECTION, SOLUTION INTRAMUSCULAR; INTRAVENOUS; SUBCUTANEOUS
Status: DISCONTINUED | OUTPATIENT
Start: 2021-06-21 | End: 2021-06-23 | Stop reason: HOSPADM

## 2021-06-21 RX ORDER — SODIUM CHLORIDE 0.9 % (FLUSH) 0.9 %
10 SYRINGE (ML) INJECTION EVERY 12 HOURS SCHEDULED
Status: DISCONTINUED | OUTPATIENT
Start: 2021-06-21 | End: 2021-06-21 | Stop reason: HOSPADM

## 2021-06-21 RX ORDER — ROPIVACAINE HYDROCHLORIDE 5 MG/ML
INJECTION, SOLUTION EPIDURAL; INFILTRATION; PERINEURAL
Status: COMPLETED
Start: 2021-06-21 | End: 2021-06-21

## 2021-06-21 RX ORDER — FENTANYL CITRATE 50 UG/ML
50 INJECTION, SOLUTION INTRAMUSCULAR; INTRAVENOUS EVERY 5 MIN PRN
Status: DISCONTINUED | OUTPATIENT
Start: 2021-06-21 | End: 2021-06-21 | Stop reason: HOSPADM

## 2021-06-21 RX ORDER — PREGABALIN 75 MG/1
75 CAPSULE ORAL ONCE
Status: DISCONTINUED | OUTPATIENT
Start: 2021-06-21 | End: 2021-06-21

## 2021-06-21 RX ORDER — ONDANSETRON 2 MG/ML
4 INJECTION INTRAMUSCULAR; INTRAVENOUS EVERY 6 HOURS PRN
Status: DISCONTINUED | OUTPATIENT
Start: 2021-06-21 | End: 2021-06-23 | Stop reason: HOSPADM

## 2021-06-21 RX ORDER — OXYCODONE HYDROCHLORIDE 5 MG/1
10 TABLET ORAL EVERY 4 HOURS PRN
Status: DISCONTINUED | OUTPATIENT
Start: 2021-06-21 | End: 2021-06-23 | Stop reason: HOSPADM

## 2021-06-21 RX ORDER — CLINDAMYCIN PHOSPHATE 900 MG/50ML
900 INJECTION INTRAVENOUS ONCE
Status: COMPLETED | OUTPATIENT
Start: 2021-06-21 | End: 2021-06-21

## 2021-06-21 RX ORDER — NICOTINE POLACRILEX 4 MG
15 LOZENGE BUCCAL PRN
Status: DISCONTINUED | OUTPATIENT
Start: 2021-06-21 | End: 2021-06-23 | Stop reason: HOSPADM

## 2021-06-21 RX ORDER — HYDROMORPHONE HYDROCHLORIDE 1 MG/ML
0.5 INJECTION, SOLUTION INTRAMUSCULAR; INTRAVENOUS; SUBCUTANEOUS
Status: DISCONTINUED | OUTPATIENT
Start: 2021-06-21 | End: 2021-06-21

## 2021-06-21 RX ORDER — HYDROMORPHONE HYDROCHLORIDE 1 MG/ML
1 INJECTION, SOLUTION INTRAMUSCULAR; INTRAVENOUS; SUBCUTANEOUS
Status: DISCONTINUED | OUTPATIENT
Start: 2021-06-21 | End: 2021-06-23 | Stop reason: HOSPADM

## 2021-06-21 RX ORDER — TAMSULOSIN HYDROCHLORIDE 0.4 MG/1
0.4 CAPSULE ORAL DAILY
Status: DISCONTINUED | OUTPATIENT
Start: 2021-06-21 | End: 2021-06-23 | Stop reason: HOSPADM

## 2021-06-21 RX ORDER — ONDANSETRON 2 MG/ML
4 INJECTION INTRAMUSCULAR; INTRAVENOUS
Status: DISCONTINUED | OUTPATIENT
Start: 2021-06-21 | End: 2021-06-21 | Stop reason: HOSPADM

## 2021-06-21 RX ORDER — SODIUM CHLORIDE, SODIUM LACTATE, POTASSIUM CHLORIDE, CALCIUM CHLORIDE 600; 310; 30; 20 MG/100ML; MG/100ML; MG/100ML; MG/100ML
INJECTION, SOLUTION INTRAVENOUS CONTINUOUS
Status: DISCONTINUED | OUTPATIENT
Start: 2021-06-21 | End: 2021-06-23 | Stop reason: HOSPADM

## 2021-06-21 RX ORDER — POLYETHYLENE GLYCOL 3350 17 G/17G
17 POWDER, FOR SOLUTION ORAL DAILY
Status: DISCONTINUED | OUTPATIENT
Start: 2021-06-21 | End: 2021-06-23 | Stop reason: HOSPADM

## 2021-06-21 RX ORDER — ONDANSETRON 4 MG/1
4 TABLET, ORALLY DISINTEGRATING ORAL EVERY 8 HOURS PRN
Status: DISCONTINUED | OUTPATIENT
Start: 2021-06-21 | End: 2021-06-23 | Stop reason: HOSPADM

## 2021-06-21 RX ORDER — TRAMADOL HYDROCHLORIDE 50 MG/1
50 TABLET ORAL EVERY 6 HOURS
Status: DISCONTINUED | OUTPATIENT
Start: 2021-06-21 | End: 2021-06-23 | Stop reason: HOSPADM

## 2021-06-21 RX ORDER — BUPIVACAINE HYDROCHLORIDE 7.5 MG/ML
INJECTION, SOLUTION INTRASPINAL PRN
Status: DISCONTINUED | OUTPATIENT
Start: 2021-06-21 | End: 2021-06-21 | Stop reason: SDUPTHER

## 2021-06-21 RX ORDER — DEXAMETHASONE SODIUM PHOSPHATE 10 MG/ML
INJECTION, SOLUTION INTRAMUSCULAR; INTRAVENOUS PRN
Status: DISCONTINUED | OUTPATIENT
Start: 2021-06-21 | End: 2021-06-21 | Stop reason: SDUPTHER

## 2021-06-21 RX ORDER — EPHEDRINE SULFATE 50 MG/ML
INJECTION, SOLUTION INTRAVENOUS PRN
Status: DISCONTINUED | OUTPATIENT
Start: 2021-06-21 | End: 2021-06-21 | Stop reason: SDUPTHER

## 2021-06-21 RX ORDER — ACETAMINOPHEN 500 MG
1000 TABLET ORAL ONCE
Status: DISCONTINUED | OUTPATIENT
Start: 2021-06-21 | End: 2021-06-21

## 2021-06-21 RX ORDER — VITAMIN B COMPLEX
1000 TABLET ORAL DAILY
Status: DISCONTINUED | OUTPATIENT
Start: 2021-06-21 | End: 2021-06-23 | Stop reason: HOSPADM

## 2021-06-21 RX ORDER — SODIUM CHLORIDE 9 MG/ML
INJECTION, SOLUTION INTRAVENOUS CONTINUOUS
Status: DISCONTINUED | OUTPATIENT
Start: 2021-06-21 | End: 2021-06-23 | Stop reason: HOSPADM

## 2021-06-21 RX ORDER — M-VIT,TX,IRON,MINS/CALC/FOLIC 27MG-0.4MG
1 TABLET ORAL DAILY
Status: DISCONTINUED | OUTPATIENT
Start: 2021-06-21 | End: 2021-06-23 | Stop reason: HOSPADM

## 2021-06-21 RX ORDER — HYDROMORPHONE HYDROCHLORIDE 1 MG/ML
0.5 INJECTION, SOLUTION INTRAMUSCULAR; INTRAVENOUS; SUBCUTANEOUS EVERY 5 MIN PRN
Status: DISCONTINUED | OUTPATIENT
Start: 2021-06-21 | End: 2021-06-21 | Stop reason: HOSPADM

## 2021-06-21 RX ORDER — ONDANSETRON 2 MG/ML
INJECTION INTRAMUSCULAR; INTRAVENOUS PRN
Status: DISCONTINUED | OUTPATIENT
Start: 2021-06-21 | End: 2021-06-21 | Stop reason: SDUPTHER

## 2021-06-21 RX ORDER — CELECOXIB 200 MG/1
200 CAPSULE ORAL ONCE
Status: COMPLETED | OUTPATIENT
Start: 2021-06-21 | End: 2021-06-21

## 2021-06-21 RX ORDER — SODIUM CHLORIDE 9 MG/ML
25 INJECTION, SOLUTION INTRAVENOUS PRN
Status: DISCONTINUED | OUTPATIENT
Start: 2021-06-21 | End: 2021-06-23 | Stop reason: HOSPADM

## 2021-06-21 RX ORDER — OXYCODONE HYDROCHLORIDE 5 MG/1
20 TABLET ORAL EVERY 4 HOURS PRN
Status: DISCONTINUED | OUTPATIENT
Start: 2021-06-21 | End: 2021-06-23 | Stop reason: HOSPADM

## 2021-06-21 RX ORDER — DEXTROSE MONOHYDRATE 50 MG/ML
100 INJECTION, SOLUTION INTRAVENOUS PRN
Status: DISCONTINUED | OUTPATIENT
Start: 2021-06-21 | End: 2021-06-23 | Stop reason: HOSPADM

## 2021-06-21 RX ORDER — CLINDAMYCIN PHOSPHATE 900 MG/50ML
900 INJECTION INTRAVENOUS EVERY 8 HOURS
Status: DISCONTINUED | OUTPATIENT
Start: 2021-06-21 | End: 2021-06-23 | Stop reason: HOSPADM

## 2021-06-21 RX ORDER — SODIUM CHLORIDE 0.9 % (FLUSH) 0.9 %
10 SYRINGE (ML) INJECTION EVERY 12 HOURS SCHEDULED
Status: DISCONTINUED | OUTPATIENT
Start: 2021-06-21 | End: 2021-06-23 | Stop reason: HOSPADM

## 2021-06-21 RX ORDER — HYDROMORPHONE HYDROCHLORIDE 1 MG/ML
2 INJECTION, SOLUTION INTRAMUSCULAR; INTRAVENOUS; SUBCUTANEOUS
Status: DISCONTINUED | OUTPATIENT
Start: 2021-06-21 | End: 2021-06-23 | Stop reason: HOSPADM

## 2021-06-21 RX ORDER — CELECOXIB 200 MG/1
200 CAPSULE ORAL ONCE
Status: DISCONTINUED | OUTPATIENT
Start: 2021-06-21 | End: 2021-06-21

## 2021-06-21 RX ADMIN — FENTANYL CITRATE 100 MCG: 50 INJECTION, SOLUTION INTRAMUSCULAR; INTRAVENOUS at 09:35

## 2021-06-21 RX ADMIN — TRANEXAMIC ACID 1000 MG: 1 INJECTION, SOLUTION INTRAVENOUS at 12:24

## 2021-06-21 RX ADMIN — ACETAMINOPHEN 650 MG: 325 TABLET ORAL at 19:40

## 2021-06-21 RX ADMIN — PREGABALIN 75 MG: 75 CAPSULE ORAL at 08:42

## 2021-06-21 RX ADMIN — TRAMADOL HYDROCHLORIDE 50 MG: 50 TABLET, FILM COATED ORAL at 19:40

## 2021-06-21 RX ADMIN — DOCUSATE SODIUM 50 MG AND SENNOSIDES 8.6 MG 1 TABLET: 8.6; 5 TABLET, FILM COATED ORAL at 14:12

## 2021-06-21 RX ADMIN — DIPHENHYDRAMINE HCL 25 MG: 25 TABLET ORAL at 20:57

## 2021-06-21 RX ADMIN — EPHEDRINE SULFATE 10 MG: 50 INJECTION INTRAMUSCULAR; INTRAVENOUS; SUBCUTANEOUS at 11:00

## 2021-06-21 RX ADMIN — VANCOMYCIN HYDROCHLORIDE 1000 MG: 1 INJECTION, POWDER, LYOPHILIZED, FOR SOLUTION INTRAVENOUS at 10:30

## 2021-06-21 RX ADMIN — METOPROLOL TARTRATE 50 MG: 50 TABLET, FILM COATED ORAL at 20:58

## 2021-06-21 RX ADMIN — CLINDAMYCIN PHOSPHATE 900 MG: 900 INJECTION, SOLUTION INTRAVENOUS at 10:47

## 2021-06-21 RX ADMIN — SODIUM CHLORIDE: 9 INJECTION, SOLUTION INTRAVENOUS at 16:04

## 2021-06-21 RX ADMIN — SODIUM CHLORIDE, SODIUM LACTATE, POTASSIUM CHLORIDE, AND CALCIUM CHLORIDE: 600; 310; 30; 20 INJECTION, SOLUTION INTRAVENOUS at 10:30

## 2021-06-21 RX ADMIN — ONDANSETRON HYDROCHLORIDE 4 MG: 2 INJECTION, SOLUTION INTRAMUSCULAR; INTRAVENOUS at 12:24

## 2021-06-21 RX ADMIN — METFORMIN HYDROCHLORIDE 500 MG: 500 TABLET, EXTENDED RELEASE ORAL at 16:04

## 2021-06-21 RX ADMIN — SODIUM CHLORIDE, SODIUM LACTATE, POTASSIUM CHLORIDE, AND CALCIUM CHLORIDE: 600; 310; 30; 20 INJECTION, SOLUTION INTRAVENOUS at 11:58

## 2021-06-21 RX ADMIN — ACETAMINOPHEN 1000 MG: 500 TABLET ORAL at 08:42

## 2021-06-21 RX ADMIN — GLIPIZIDE 5 MG: 5 TABLET ORAL at 14:12

## 2021-06-21 RX ADMIN — PROPOFOL 80 MG: 10 INJECTION, EMULSION INTRAVENOUS at 10:50

## 2021-06-21 RX ADMIN — ACETAMINOPHEN 650 MG: 325 TABLET ORAL at 14:11

## 2021-06-21 RX ADMIN — EPHEDRINE SULFATE 10 MG: 50 INJECTION INTRAMUSCULAR; INTRAVENOUS; SUBCUTANEOUS at 11:02

## 2021-06-21 RX ADMIN — DOCUSATE SODIUM 50 MG AND SENNOSIDES 8.6 MG 1 TABLET: 8.6; 5 TABLET, FILM COATED ORAL at 20:57

## 2021-06-21 RX ADMIN — Medication 1000 UNITS: at 14:12

## 2021-06-21 RX ADMIN — PROPOFOL 120 MCG/KG/MIN: 10 INJECTION, EMULSION INTRAVENOUS at 10:49

## 2021-06-21 RX ADMIN — TRAMADOL HYDROCHLORIDE 50 MG: 50 TABLET, FILM COATED ORAL at 14:12

## 2021-06-21 RX ADMIN — POLYETHYLENE GLYCOL 3350 17 G: 17 POWDER, FOR SOLUTION ORAL at 16:04

## 2021-06-21 RX ADMIN — CELECOXIB 200 MG: 200 CAPSULE ORAL at 08:42

## 2021-06-21 RX ADMIN — TAMSULOSIN HYDROCHLORIDE 0.4 MG: 0.4 CAPSULE ORAL at 14:11

## 2021-06-21 RX ADMIN — Medication 1 TABLET: at 14:11

## 2021-06-21 RX ADMIN — LIDOCAINE HYDROCHLORIDE 50 MG: 10 INJECTION, SOLUTION INFILTRATION; PERINEURAL at 10:49

## 2021-06-21 RX ADMIN — SODIUM CHLORIDE, POTASSIUM CHLORIDE, SODIUM LACTATE AND CALCIUM CHLORIDE: 600; 310; 30; 20 INJECTION, SOLUTION INTRAVENOUS at 08:41

## 2021-06-21 RX ADMIN — Medication 1000 MG: at 08:41

## 2021-06-21 RX ADMIN — CLINDAMYCIN IN 5 PERCENT DEXTROSE 900 MG: 18 INJECTION, SOLUTION INTRAVENOUS at 19:39

## 2021-06-21 RX ADMIN — DEXAMETHASONE SODIUM PHOSPHATE 10 MG: 10 INJECTION, SOLUTION INTRAMUSCULAR; INTRAVENOUS at 10:56

## 2021-06-21 RX ADMIN — TRANEXAMIC ACID 1000 MG: 1 INJECTION, SOLUTION INTRAVENOUS at 10:57

## 2021-06-21 RX ADMIN — VANCOMYCIN HYDROCHLORIDE 1500 MG: 10 INJECTION, POWDER, LYOPHILIZED, FOR SOLUTION INTRAVENOUS at 20:57

## 2021-06-21 RX ADMIN — OXYCODONE HYDROCHLORIDE 10 MG: 10 TABLET, FILM COATED, EXTENDED RELEASE ORAL at 20:57

## 2021-06-21 RX ADMIN — BUPIVACAINE HYDROCHLORIDE IN DEXTROSE 2 ML: 7.5 INJECTION, SOLUTION SUBARACHNOID at 10:43

## 2021-06-21 RX ADMIN — ROPIVACAINE HYDROCHLORIDE 20 ML: 5 INJECTION, SOLUTION EPIDURAL; INFILTRATION; PERINEURAL at 09:39

## 2021-06-21 RX ADMIN — RIVAROXABAN 10 MG: 10 TABLET, FILM COATED ORAL at 20:57

## 2021-06-21 ASSESSMENT — PAIN - FUNCTIONAL ASSESSMENT
PAIN_FUNCTIONAL_ASSESSMENT: PREVENTS OR INTERFERES SOME ACTIVE ACTIVITIES AND ADLS
PAIN_FUNCTIONAL_ASSESSMENT: PREVENTS OR INTERFERES SOME ACTIVE ACTIVITIES AND ADLS

## 2021-06-21 ASSESSMENT — PAIN DESCRIPTION - LOCATION
LOCATION: KNEE
LOCATION: KNEE

## 2021-06-21 ASSESSMENT — PAIN SCALES - GENERAL
PAINLEVEL_OUTOF10: 0
PAINLEVEL_OUTOF10: 2
PAINLEVEL_OUTOF10: 2

## 2021-06-21 ASSESSMENT — PAIN DESCRIPTION - PROGRESSION
CLINICAL_PROGRESSION: GRADUALLY WORSENING
CLINICAL_PROGRESSION: GRADUALLY WORSENING

## 2021-06-21 ASSESSMENT — PAIN DESCRIPTION - DESCRIPTORS
DESCRIPTORS: SORE
DESCRIPTORS: SORE;DULL

## 2021-06-21 ASSESSMENT — LIFESTYLE VARIABLES: SMOKING_STATUS: 0

## 2021-06-21 ASSESSMENT — PAIN DESCRIPTION - ORIENTATION
ORIENTATION: LEFT
ORIENTATION: LEFT

## 2021-06-21 ASSESSMENT — PAIN DESCRIPTION - ONSET
ONSET: GRADUAL
ONSET: GRADUAL

## 2021-06-21 ASSESSMENT — PAIN DESCRIPTION - FREQUENCY
FREQUENCY: INTERMITTENT
FREQUENCY: INTERMITTENT

## 2021-06-21 ASSESSMENT — PAIN DESCRIPTION - PAIN TYPE
TYPE: SURGICAL PAIN
TYPE: SURGICAL PAIN

## 2021-06-21 NOTE — ANESTHESIA POSTPROCEDURE EVALUATION
Department of Anesthesiology  Postprocedure Note    Patient: Ana Mcgrath  MRN: [id number]  YOB: 1950  Date of evaluation: 6/21/2021  Time:  12:57 PM     Procedure Summary     Date: 06/21/21 Room / Location: [facility] OR [address] [place] [address]    Anesthesia Start: 1030 Anesthesia Stop: 8090    Procedure: LEFT TOTAL KNEE REPLACEMENT (Left Knee) Diagnosis: (M17.12)    Surgeons: Donny Jade MD Responsible Provider: MARY Kc CRNA    Anesthesia Type: regional, spinal, MAC ASA Status: 3          Anesthesia Type: regional, spinal, MAC    Celeste Phase I: Celeste Score: 10    Celeste Phase II:      Last vitals: Reviewed and per EMR flowsheets.        Anesthesia Post Evaluation    Patient location during evaluation: PACU  Patient participation: complete - patient participated  Level of consciousness: sleepy but conscious  Pain score: 0  Airway patency: patent  Nausea & Vomiting: no nausea and no vomiting  Complications: no  Cardiovascular status: hemodynamically stable  Respiratory status: acceptable, spontaneous ventilation and room air  Hydration status: euvolemic

## 2021-06-21 NOTE — H&P
[facility] ([facility]) Pre-Operative History and Physical    Patient Name: Neftali  : 1950    There were no vitals taken for this visit. Pre-Operative Diagnosis:  Knee arthritits    Proposed Surgical Procedure:   Knee replacement      Past Medical Hisotry:       Diagnosis Date    Acute pain of right hip     Arthritis     Cancer ([address]     MELANOMA REMOVED FROM LYMPH NODE IN RIGHT JAW    Carotid artery stenosis     Diabetes ([address]     TYPE 2    Diabetes mellitus ([address]     Hyperlipidemia     Hypertension     Knee pain     Osteoarthritis     Restless legs syndrome     Thyroid disease      Past Surgical History:       Procedure Laterality Date    BACK SURGERY  2013    LUMBAR/LASER CLEAN UP RUPTURED [address]    CAROTID ENDARTERECTOMY Right 8/5/15 [facility]rez    JOINT REPLACEMENT Left     THR    OTHER SURGICAL HISTORY      radiation and neck dissection for melanoma    TOTAL HIP ARTHROPLASTY Right 2016    HIP TOTAL ARTHROPLASTY ANTERIOR APPROACH performed by Raven Jacques MD at [facility] OR       Medications:   Prior to Admission medications    Medication Sig Start Date End Date Taking?  Authorizing Provider   levothyroxine (SYNTHROID) 100 MCG tablet Take 100 mcg by mouth Daily    Historical Provider, MD   metoprolol tartrate (LOPRESSOR) 50 MG tablet Take 50 mg by mouth 2 times daily    Historical Provider, MD   acetaminophen (TYLENOL) 500 MG tablet Take 500 mg by mouth every 6 hours as needed for Pain    Historical Provider, MD   glipiZIDE (GLUCOTROL) 5 MG ER tablet Take 5 mg by mouth daily    Historical Provider, MD   metFORMIN ER, MOD, (GLUMETZA) 500 MG ER tablet Take 500 mg by mouth 2 times daily (with meals)     Historical Provider, MD   aspirin 81 MG tablet Take 81 mg by mouth daily    Historical Provider, MD   diphenhydrAMINE (BENADRYL) 25 MG tablet Take 25 mg by mouth nightly as needed for Itching    Historical Provider, MD   Omega-3 Fatty Acids (FISH OIL) 1000 MG CAPS Take 1,000 mg by mouth lymphadenopathy    General Appearance: Patient is well nourished, well developed    HEENT: Normal    CARDIOVASCULAR: Normal S1 and S2.  Regular rhythm. No murmurs, gallops, or rubs. PULMONARY: Normal.    GASTROINTESTINAL: Soft, non-tender, normal bowel sounds. No bruits, organomegaly or masses.     EXTREMITIES: positive exam findings: lateral joint line tenderness, tender over tibial tubercle, ecchymosis noted entire limb and ROM limited to approximately 80 degrees    MUSCULOSKELETAL: Tenderness over right hip(s)    NEUROLOGICAL: gait and coordination normal or speech normal    Diagnostic Studies:      Labs: CBC with Differential:    Lab Results   Component Value Date    WBC 5.9 04/15/2021    RBC 4.43 04/15/2021    HGB 15.0 04/15/2021    HCT 45.9 04/15/2021     04/15/2021    .6 04/15/2021    MCH 33.9 04/15/2021    MCHC 32.7 04/15/2021    RDW 12.7 04/15/2021    LYMPHOPCT 33.4 04/15/2021    MONOPCT 11.0 04/15/2021    BASOPCT 0.8 04/15/2021    MONOSABS 0.70 04/15/2021    LYMPHSABS 2.0 04/15/2021    EOSABS 0.50 04/15/2021    BASOSABS 0.10 04/15/2021     BMP:    Lab Results   Component Value Date     04/15/2021    K 4.7 04/15/2021     04/15/2021    CO2 28 04/15/2021    BUN 12 04/15/2021    LABALBU 4.1 04/15/2021    CREATININE 0.6 04/15/2021    CALCIUM 8.9 04/15/2021    GFRAA >59 04/15/2021    LABGLOM >60 04/15/2021    GLUCOSE 146 04/15/2021     Sodium:    Lab Results   Component Value Date     04/15/2021     Potassium:    Lab Results   Component Value Date    K 4.7 04/15/2021     BUN/Creatinine:    Lab Results   Component Value Date    BUN 12 04/15/2021    CREATININE 0.6 04/15/2021     PT/INR:    Lab Results   Component Value Date    PROTIME 13.5 04/15/2021    INR 1.04 04/15/2021     PTT:    Lab Results   Component Value Date    APTT 26.6 04/15/2021   [APTT}  U/A:    Lab Results   Component Value Date    COLORU YELLOW 04/15/2021    PHUR 6.5 04/15/2021    WBCUA 3 08/02/2016    RBCUA 3 08/02/2016    BACTERIA NEGATIVE 08/02/2016    CLARITYU Clear 04/15/2021    SPECGRAV 1.018 04/15/2021    LEUKOCYTESUR Negative 04/15/2021    UROBILINOGEN 0.2 04/15/2021    BILIRUBINUR Negative 04/15/2021    BLOODU Negative 04/15/2021    GLUCOSEU Negative 04/15/2021     HgBA1c:  No components found for: HGBA1C        PLAN:  Knee replacement L.       Electronically signed by Yifan Vasquez MD on 6/21/2021 at 7:00 AM

## 2021-06-21 NOTE — ANESTHESIA PROCEDURE NOTES
Peripheral Block    Patient location during procedure: holding area  Start time: 6/21/2021 9:38 AM  End time: 6/21/2021 9:40 AM  Staffing  Performed: anesthesiologist   Anesthesiologist: Sixto Brand MD  Preanesthetic Checklist  Completed: patient identified, IV checked, site marked, risks and benefits discussed, surgical consent, monitors and equipment checked, pre-op evaluation, timeout performed, anesthesia consent given, oxygen available and patient being monitored  Peripheral Block  Patient position: supine  Prep: ChloraPrep  Patient monitoring: frequent blood pressure checks  Block type: Femoral  Laterality: left  Injection technique: single-shot  Guidance: ultrasound guided  Local infiltration: lidocaine  Adductor canal  Provider prep: sterile gloves and mask  Local infiltration: lidocaine  Needle  Needle type: Quincke   Needle gauge: 20 G  Needle length: 10 cm  Needle localization: ultrasound guidance  Assessment  Injection assessment: negative aspiration for heme, local visualized surrounding nerve on ultrasound and no paresthesia on injection  Paresthesia pain: none  Slow fractionated injection: yes  Hemodynamics: stable  Medications Administered  Ropivacaine (NAROPIN) injection 0.5%, 20 mL  Reason for block: post-op pain management

## 2021-06-21 NOTE — ANESTHESIA PRE PROCEDURE
Department of Anesthesiology  Preprocedure Note       Name:  Gibran Rosales   Age:  66 y.o.  :  1950                                          MRN:  [id number]         Date:  2021      Surgeon: Curt Ny):  Carl Lange MD    Procedure: Procedure(s):  LEFT TOTAL KNEE REPLACEMENT    Medications prior to admission:   Prior to Admission medications    Medication Sig Start Date End Date Taking?  Authorizing Provider   levothyroxine (SYNTHROID) 100 MCG tablet Take 100 mcg by mouth Daily    Historical Provider, MD   metoprolol tartrate (LOPRESSOR) 50 MG tablet Take 50 mg by mouth 2 times daily    Historical Provider, MD   acetaminophen (TYLENOL) 500 MG tablet Take 500 mg by mouth every 6 hours as needed for Pain    Historical Provider, MD   glipiZIDE (GLUCOTROL) 5 MG ER tablet Take 5 mg by mouth daily    Historical Provider, MD   metFORMIN ER, MOD, (GLUMETZA) 500 MG ER tablet Take 500 mg by mouth 2 times daily (with meals)     Historical Provider, MD   aspirin 81 MG tablet Take 81 mg by mouth daily    Historical Provider, MD   diphenhydrAMINE (BENADRYL) 25 MG tablet Take 25 mg by mouth nightly as needed for Itching    Historical Provider, MD   Omega-3 Fatty Acids (FISH OIL) 1000 MG CAPS Take 1,000 mg by mouth daily    Historical Provider, MD   Multiple Vitamins-Minerals (MULTIVITAMIN ADULTS 50+) TABS Take 1 tablet by mouth daily     Historical Provider, MD   Coenzyme Q10 (COQ10) 200 MG CAPS Take 1 capsule by mouth daily     Historical Provider, MD   Cholecalciferol (VITAMIN D-3) 1000 UNITS CAPS Take 1 capsule by mouth daily     Historical Provider, MD       Current medications:    Current Facility-Administered Medications   Medication Dose Route Frequency Provider Last Rate Last Admin    acetaminophen (TYLENOL) tablet 1,000 mg  1,000 mg Oral Once Carl Lange MD        celecoxib (CELEBREX) capsule 200 mg  200 mg Oral Once Carl Lange MD        clindamycin (CLEOCIN) 900 mg in dextrose 5 % 50 mL IVPB 900 mg Intravenous Once Sangita Blackwood MD        dexamethasone (PF) (DECADRON) injection 10 mg  10 mg Intravenous Once Sangita Blackwood MD        pregabalin [facility]) capsule 75 mg  75 mg Oral Once Sangita Blackwood MD        vancomycin [facility]) 1000 mg in dextrose 5% 250 mL IVPB  1,000 mg Intravenous Once Sangita Blackwood MD           Allergies: Allergies   Allergen Reactions    Ancef [Cefazolin] Hives    Hydrocodone-Acetaminophen      HIVES    Tape Anita Burger Tape]      THE ADHESIVE PART       Problem List:    Patient Active Problem List   Diagnosis Code    AVN (avascular necrosis of bone) ([facility]) M87.00    Cancer ([address] C80.1    Carotid artery stenosis I65.29    Hyperlipidemia E78.5    Hypertension I10    Restless legs syndrome G25.81    Diabetes mellitus ([address] E11.9    Arthritis M19.90    Thyroid disease E07.9    Diabetes ([facility]) E11.9       Past Medical History:        Diagnosis Date    Acute pain of right hip     Arthritis     Cancer ([address] 1998    MELANOMA REMOVED FROM LYMPH NODE IN RIGHT JAW    Carotid artery stenosis     Diabetes (HCC)     TYPE 2    Diabetes mellitus (HCC)     Hyperlipidemia     Hypertension     Knee pain     Osteoarthritis     Restless legs syndrome     Thyroid disease        Past Surgical History:        Procedure Laterality Date    BACK SURGERY  2013    LUMBAR/LASER CLEAN UP RUPTURED DISC    CAROTID ENDARTERECTOMY Right 8/5/15 [facility]    JOINT REPLACEMENT Left 2010    THR    OTHER SURGICAL HISTORY      radiation and neck dissection for melanoma    TOTAL HIP ARTHROPLASTY Right 8/22/2016    HIP TOTAL ARTHROPLASTY ANTERIOR APPROACH performed by Sangita Blackwood MD at [facility] OR       Social History:    Social History     Tobacco Use    Smoking status: Never Smoker    Smokeless tobacco: Never Used   Substance Use Topics    Alcohol use: No     Comment: HX OF ETOH ABUSE                                Counseling given: Not Answered      Vital Signs (Current):  There were no vitals filed for this visit. BP Readings from Last 3 Encounters:   06/28/17 118/70   12/15/16 123/79   08/29/16 109/67       NPO Status:                                                                                 BMI:   Wt Readings from Last 3 Encounters:   06/15/21 230 lb (104.3 kg)   04/15/21 238 lb (108 kg)   12/15/16 230 lb (104.3 kg)     There is no height or weight on file to calculate BMI.    CBC:   Lab Results   Component Value Date    WBC 5.9 04/15/2021    RBC 4.43 04/15/2021    HGB 15.0 04/15/2021    HCT 45.9 04/15/2021    .6 04/15/2021    RDW 12.7 04/15/2021     04/15/2021       CMP:   Lab Results   Component Value Date     04/15/2021    K 4.7 04/15/2021     04/15/2021    CO2 28 04/15/2021    BUN 12 04/15/2021    CREATININE 0.6 04/15/2021    GFRAA >59 04/15/2021    LABGLOM >60 04/15/2021    GLUCOSE 146 04/15/2021    PROT 6.5 04/15/2021    CALCIUM 8.9 04/15/2021    BILITOT 0.4 04/15/2021    ALKPHOS 51 04/15/2021    AST 21 04/15/2021    ALT 21 04/15/2021       POC Tests: No results for input(s): POCGLU, POCNA, POCK, POCCL, POCBUN, POCHEMO, POCHCT in the last 72 hours.     Coags:   Lab Results   Component Value Date    PROTIME 13.5 04/15/2021    INR 1.04 04/15/2021    APTT 26.6 04/15/2021       HCG (If Applicable): No results found for: PREGTESTUR, PREGSERUM, HCG, HCGQUANT     ABGs: No results found for: PHART, PO2ART, YNN3IEE, VVC1IGL, BEART, A4HQGRIH     Type & Screen (If Applicable):  No results found for: LABABO, LABRH    Drug/Infectious Status (If Applicable):  No results found for: HIV, HEPCAB    COVID-19 Screening (If Applicable): No results found for: COVID19        Anesthesia Evaluation  Patient summary reviewed no history of anesthetic complications:   Airway: Mallampati: I  TM distance: >3 FB   Neck ROM: full  Mouth opening: > = 3 FB Dental:          Pulmonary:normal exam  breath sounds clear to auscultation      (-) asthma, recent URI, sleep apnea and not a current smoker                           Cardiovascular:  Exercise tolerance: good (>4 METS),   (+) hypertension:,     (-) pacemaker, past MI, CABG/stent and  angina    ECG reviewed  Rhythm: regular  Rate: normal           Beta Blocker:  Dose within 24 Hrs         Neuro/Psych:      (-) seizures, TIA and CVA           GI/Hepatic/Renal:        (-) GERD, liver disease and no renal disease       Endo/Other:    (+) DiabetesType II DM, , hypothyroidism::., .    (-) hyperthyroidism               Abdominal:   (+) obese,         Vascular:                                        Anesthesia Plan      regional, spinal and MAC     ASA 3     (Adductor canal block  Preop famotidine)  Induction: intravenous. MIPS: Postoperative opioids intended and Prophylactic antiemetics administered. Anesthetic plan and risks discussed with patient and spouse. Use of blood products discussed with patient and spouse whom consented to blood products.                    José Miguel Velazquez MD   6/21/2021

## 2021-06-21 NOTE — PROGRESS NOTES
Pharmacy Note  Vancomycin Consult    Roslyn Ny is a 66 y.o. male started on Vancomycin for surgical prophylaxis; consult received from Dr. Valeria Meyers  to manage therapy. Also receiving the following antibiotics: Clindamicin. Active Problems:    Arthritis of knee  Resolved Problems:    * No resolved hospital problems. *      Allergies: Ancef [cefazolin], Hydrocodone-acetaminophen, and Tape [adhesive tape]     Temp max: 97.4    No results for input(s): BUN in the last 72 hours. No results for input(s): CREATININE in the last 72 hours. No results for input(s): WBC in the last 72 hours. Intake/Output Summary (Last 24 hours) at 6/21/2021 1403  Last data filed at 6/21/2021 1257  Gross per 24 hour   Intake 1500 ml   Output 205 ml   Net 1295 ml       Culture Date Source Results   none         Ht Readings from Last 1 Encounters:   06/21/21 5' 11\" (1.803 m)        Wt Readings from Last 1 Encounters:   06/21/21 230 lb (104.3 kg)         Body mass index is 32.08 kg/m². CrCl cannot be calculated (Patient's most recent lab result is older than the maximum 10 days allowed. ). Assessment/Plan:  Will initiate vancomycin 1500 mg IV every 12 hours x 2 doses post op. Patient had 1000mg IV dose pre op at [address] [place] [address]. Past Scr was 0.6 on 4/16/21. Thank you for the consult.      Electronically signed by Yudy Alves, [address] [place] [address] on 6/21/2021 at 2:03 PM

## 2021-06-21 NOTE — BRIEF OP NOTE
Department of Orthopedic Surgery  Brief Operative Report      Surgeon: Graham Gardiner    Procedure: Left TKA    Anesthesia:  Spinal Anesthesia and block    Estimated blood loss:  75cc    Fluids:1400cc    TT: 50 minutes    UO: 125cc     Drians:  none      See dictated operative report for full details.     Electronically signed by Rona Robertson MD on 6/21/21 at 12:27 PM CDT

## 2021-06-21 NOTE — H&P
I have reviewed the history and physical for planned procedure. I have re-examined the patient and there are no changes to the history and physical unless noted below.     Electronically signed by Ruperto Augustin MD on 6/21/2021 at 9:10 AM

## 2021-06-22 LAB
ANION GAP SERPL CALCULATED.3IONS-SCNC: 9 MMOL/L (ref 7–19)
BUN BLDV-MCNC: 13 MG/DL (ref 8–23)
CALCIUM SERPL-MCNC: 8.4 MG/DL (ref 8.8–10.2)
CHLORIDE BLD-SCNC: 103 MMOL/L (ref 98–111)
CO2: 24 MMOL/L (ref 22–29)
CREAT SERPL-MCNC: 0.7 MG/DL (ref 0.5–1.2)
GFR AFRICAN AMERICAN: >59
GFR NON-AFRICAN AMERICAN: >60
GLUCOSE BLD-MCNC: 164 MG/DL (ref 70–99)
GLUCOSE BLD-MCNC: 185 MG/DL (ref 70–99)
GLUCOSE BLD-MCNC: 185 MG/DL (ref 70–99)
GLUCOSE BLD-MCNC: 194 MG/DL (ref 70–99)
GLUCOSE BLD-MCNC: 205 MG/DL (ref 74–109)
HBA1C MFR BLD: 5.8 % (ref 4–6)
HCT VFR BLD CALC: 36.2 % (ref 42–52)
HEMOGLOBIN: 12.5 G/DL (ref 14–18)
PERFORMED ON: ABNORMAL
POTASSIUM REFLEX MAGNESIUM: 4.5 MMOL/L (ref 3.5–5)
SODIUM BLD-SCNC: 136 MMOL/L (ref 136–145)

## 2021-06-22 PROCEDURE — 83036 HEMOGLOBIN GLYCOSYLATED A1C: CPT

## 2021-06-22 PROCEDURE — 2580000003 HC RX 258: Performed by: ORTHOPAEDIC SURGERY

## 2021-06-22 PROCEDURE — 80048 BASIC METABOLIC PNL TOTAL CA: CPT

## 2021-06-22 PROCEDURE — 1210000000 HC MED SURG R&B

## 2021-06-22 PROCEDURE — 6360000002 HC RX W HCPCS: Performed by: ORTHOPAEDIC SURGERY

## 2021-06-22 PROCEDURE — 36415 COLL VENOUS BLD VENIPUNCTURE: CPT

## 2021-06-22 PROCEDURE — 2700000000 HC OXYGEN THERAPY PER DAY

## 2021-06-22 PROCEDURE — 2500000003 HC RX 250 WO HCPCS: Performed by: ORTHOPAEDIC SURGERY

## 2021-06-22 PROCEDURE — 85014 HEMATOCRIT: CPT

## 2021-06-22 PROCEDURE — 97161 PT EVAL LOW COMPLEX 20 MIN: CPT

## 2021-06-22 PROCEDURE — 85018 HEMOGLOBIN: CPT

## 2021-06-22 PROCEDURE — 6370000000 HC RX 637 (ALT 250 FOR IP): Performed by: ORTHOPAEDIC SURGERY

## 2021-06-22 PROCEDURE — 97116 GAIT TRAINING THERAPY: CPT

## 2021-06-22 PROCEDURE — 82947 ASSAY GLUCOSE BLOOD QUANT: CPT

## 2021-06-22 RX ADMIN — TRAMADOL HYDROCHLORIDE 50 MG: 50 TABLET, FILM COATED ORAL at 20:04

## 2021-06-22 RX ADMIN — METFORMIN HYDROCHLORIDE 500 MG: 500 TABLET, EXTENDED RELEASE ORAL at 17:07

## 2021-06-22 RX ADMIN — ACETAMINOPHEN 650 MG: 325 TABLET ORAL at 15:22

## 2021-06-22 RX ADMIN — SODIUM CHLORIDE: 9 INJECTION, SOLUTION INTRAVENOUS at 02:54

## 2021-06-22 RX ADMIN — METOPROLOL TARTRATE 50 MG: 50 TABLET, FILM COATED ORAL at 20:04

## 2021-06-22 RX ADMIN — CLINDAMYCIN IN 5 PERCENT DEXTROSE 900 MG: 18 INJECTION, SOLUTION INTRAVENOUS at 20:05

## 2021-06-22 RX ADMIN — INSULIN LISPRO 1 UNITS: 100 INJECTION, SOLUTION INTRAVENOUS; SUBCUTANEOUS at 13:43

## 2021-06-22 RX ADMIN — TRAMADOL HYDROCHLORIDE 50 MG: 50 TABLET, FILM COATED ORAL at 15:21

## 2021-06-22 RX ADMIN — ACETAMINOPHEN 650 MG: 325 TABLET ORAL at 02:35

## 2021-06-22 RX ADMIN — ACETAMINOPHEN 650 MG: 325 TABLET ORAL at 20:04

## 2021-06-22 RX ADMIN — METFORMIN HYDROCHLORIDE 500 MG: 500 TABLET, EXTENDED RELEASE ORAL at 08:12

## 2021-06-22 RX ADMIN — VANCOMYCIN HYDROCHLORIDE 1500 MG: 10 INJECTION, POWDER, LYOPHILIZED, FOR SOLUTION INTRAVENOUS at 10:26

## 2021-06-22 RX ADMIN — POLYETHYLENE GLYCOL 3350 17 G: 17 POWDER, FOR SOLUTION ORAL at 10:28

## 2021-06-22 RX ADMIN — SODIUM CHLORIDE, PRESERVATIVE FREE 10 ML: 5 INJECTION INTRAVENOUS at 21:30

## 2021-06-22 RX ADMIN — Medication 1 TABLET: at 10:28

## 2021-06-22 RX ADMIN — ACETAMINOPHEN 650 MG: 325 TABLET ORAL at 08:12

## 2021-06-22 RX ADMIN — OXYCODONE HYDROCHLORIDE 10 MG: 10 TABLET, FILM COATED, EXTENDED RELEASE ORAL at 10:29

## 2021-06-22 RX ADMIN — CLINDAMYCIN IN 5 PERCENT DEXTROSE 900 MG: 18 INJECTION, SOLUTION INTRAVENOUS at 13:15

## 2021-06-22 RX ADMIN — OXYCODONE HYDROCHLORIDE 10 MG: 10 TABLET, FILM COATED, EXTENDED RELEASE ORAL at 20:04

## 2021-06-22 RX ADMIN — INSULIN LISPRO 1 UNITS: 100 INJECTION, SOLUTION INTRAVENOUS; SUBCUTANEOUS at 17:07

## 2021-06-22 RX ADMIN — RIVAROXABAN 10 MG: 10 TABLET, FILM COATED ORAL at 17:07

## 2021-06-22 RX ADMIN — TRAMADOL HYDROCHLORIDE 50 MG: 50 TABLET, FILM COATED ORAL at 08:12

## 2021-06-22 RX ADMIN — DOCUSATE SODIUM 50 MG AND SENNOSIDES 8.6 MG 1 TABLET: 8.6; 5 TABLET, FILM COATED ORAL at 10:28

## 2021-06-22 RX ADMIN — TAMSULOSIN HYDROCHLORIDE 0.4 MG: 0.4 CAPSULE ORAL at 10:29

## 2021-06-22 RX ADMIN — TRAMADOL HYDROCHLORIDE 50 MG: 50 TABLET, FILM COATED ORAL at 02:35

## 2021-06-22 RX ADMIN — CLINDAMYCIN IN 5 PERCENT DEXTROSE 900 MG: 18 INJECTION, SOLUTION INTRAVENOUS at 02:34

## 2021-06-22 RX ADMIN — LEVOTHYROXINE SODIUM 100 MCG: 100 TABLET ORAL at 08:12

## 2021-06-22 RX ADMIN — GLIPIZIDE 5 MG: 5 TABLET ORAL at 08:12

## 2021-06-22 RX ADMIN — METOPROLOL TARTRATE 50 MG: 50 TABLET, FILM COATED ORAL at 10:29

## 2021-06-22 RX ADMIN — DOCUSATE SODIUM 50 MG AND SENNOSIDES 8.6 MG 1 TABLET: 8.6; 5 TABLET, FILM COATED ORAL at 20:04

## 2021-06-22 RX ADMIN — Medication 1000 UNITS: at 10:29

## 2021-06-22 ASSESSMENT — PAIN DESCRIPTION - LOCATION
LOCATION: KNEE

## 2021-06-22 ASSESSMENT — PAIN DESCRIPTION - FREQUENCY
FREQUENCY: INTERMITTENT
FREQUENCY: OTHER (COMMENT)
FREQUENCY: INTERMITTENT

## 2021-06-22 ASSESSMENT — PAIN - FUNCTIONAL ASSESSMENT
PAIN_FUNCTIONAL_ASSESSMENT: PREVENTS OR INTERFERES SOME ACTIVE ACTIVITIES AND ADLS

## 2021-06-22 ASSESSMENT — PAIN DESCRIPTION - PROGRESSION
CLINICAL_PROGRESSION: GRADUALLY IMPROVING
CLINICAL_PROGRESSION: GRADUALLY WORSENING
CLINICAL_PROGRESSION: GRADUALLY IMPROVING

## 2021-06-22 ASSESSMENT — PAIN SCALES - GENERAL
PAINLEVEL_OUTOF10: 1
PAINLEVEL_OUTOF10: 0
PAINLEVEL_OUTOF10: 7
PAINLEVEL_OUTOF10: 0
PAINLEVEL_OUTOF10: 1
PAINLEVEL_OUTOF10: 1

## 2021-06-22 ASSESSMENT — PAIN DESCRIPTION - PAIN TYPE
TYPE: SURGICAL PAIN

## 2021-06-22 ASSESSMENT — PAIN DESCRIPTION - DESCRIPTORS
DESCRIPTORS: SORE

## 2021-06-22 ASSESSMENT — PAIN DESCRIPTION - ONSET
ONSET: ON-GOING
ONSET: UNABLE TO TELL
ONSET: GRADUAL

## 2021-06-22 ASSESSMENT — PAIN DESCRIPTION - ORIENTATION
ORIENTATION: LEFT

## 2021-06-22 NOTE — PROGRESS NOTES
Physical Therapy  Facility/Department: [facility] SURG SERVICES  Initial Assessment    NAME: Gibran Rosales  : 1950  MRN: [id number]    Date of Service: 2021    Discharge Recommendations:  Continue to assess pending progress, Patient would benefit from continued therapy after discharge (pt plans to d/c home on  with [facility] services)        Assessment   Body structures, Functions, Activity limitations: Decreased functional mobility ; Decreased ADL status; Decreased ROM; Decreased strength;Decreased endurance;Decreased balance; Increased pain  Assessment: pt would benefit from skilled physical therapy in this setting to address functional mobility and strength/endurance deficits  Prognosis: Good  Decision Making: Low Complexity  PT Education: PT Role;Transfer Training;Weight-bearing Education;General Safety;Gait Training;Functional Mobility Training; Injury Prevention  REQUIRES PT FOLLOW UP: Yes  Activity Tolerance  Activity Tolerance: Patient Tolerated treatment well;Patient limited by fatigue;Patient limited by pain; Patient limited by endurance       Patient Diagnosis(es): There were no encounter diagnoses. has a past medical history of Acute pain of right hip, Arthritis, Cancer ([address] Carotid artery stenosis, Diabetes ([address] Diabetes mellitus ([address] Hyperlipidemia, Hypertension, Knee pain, Osteoarthritis, Restless legs syndrome, and Thyroid disease. has a past surgical history that includes Carotid endarterectomy (Right, 8/5/15 [facility]); joint replacement (Left, ); back surgery (); Total hip arthroplasty (Right, 2016); other surgical history; and Total knee arthroplasty (Left, 2021).     Restrictions  Restrictions/Precautions  Restrictions/Precautions: Weight Bearing, Fall Risk  Required Braces or Orthoses?: No  Lower Extremity Weight Bearing Restrictions  Left Lower Extremity Weight Bearing: Weight Bearing As Tolerated  Vision/Hearing        Subjective  General  Family / Caregiver Present: Yes (Spouse)  Diagnosis: L TKA  Follows Commands: Within Functional Limits  Subjective  Subjective: pt willing to work with physical therapy  Pain Screening  Patient Currently in Pain: Yes  Pain Assessment  Pain Assessment: 0-10  Pain Level: 7 (during ambulation)  Patient's Stated Pain Goal: No pain  Pain Type: Surgical pain  Pain Location: Knee  Pain Orientation: Left  Pain Descriptors: Sore  Pain Frequency: Intermittent  Pain Onset: On-going  Clinical Progression: Gradually improving  Functional Pain Assessment: Prevents or interferes some active activities and ADLs  Non-Pharmaceutical Pain Intervention(s): Repositioned; Ambulation/Increased Activity; Elevation;Cold applied  Response to Pain Intervention: Patient Satisfied  Vital Signs  Patient Currently in Pain: Yes  Pre Treatment Pain Screening  Intervention List: Patient able to continue with treatment    Orientation     Social/Functional History  Social/Functional History  Lives With: Spouse  Type of Home: House  Home Layout: Two level  Home Access: Stairs to enter with rails  Entrance Stairs - Number of Steps: 6  Entrance Stairs - Rails: Both  Bathroom Shower/Tub: Walk-in shower  Bathroom Toilet: Handicap height  Bathroom Equipment: Grab bars in shower, Built-in shower seat  Home Equipment: Cane, Rolling walker  ADL Assistance: Independent  Ambulation Assistance: Independent  Transfer Assistance: Independent  Active : Yes  Cognition        Objective          AROM RLE (degrees)  RLE AROM: WFL  AROM LLE (degrees)  LLE AROM : WFL  LLE General AROM: as expected post-op  Strength RLE  Strength RLE: WFL  Strength LLE  Strength LLE: WFL  Comment: as expected post-op        Bed mobility  Supine to Sit: Minimal assistance (Needed a hand to pull on to get to EOB)  Comment: Informed pt that he can log roll and push up to sitting instead of doing triplanar motion onto EOB  Transfers  Sit to Stand: Contact guard assistance  Stand to sit: Contact guard assistance  Ambulation  Ambulation?: Yes  WB Status: wbat  Ambulation 1  Surface: level tile  Device: Rolling Walker  Assistance: Contact guard assistance  Gait Deviations: Slow Chloe; Increased BETI; Decreased step length;Decreased step height  Distance: 100 ft  Comments: pt stated pain increased during ambulation, only when he bent the knee     Balance  Sitting - Static: Good  Standing - Static: Good (Able to brush teeth and maintain standing balance without UE support)        Plan   Plan  Times per week: 5-7  Plan weeks: 2  Current Treatment Recommendations: Strengthening, ROM, Balance Training, Functional Mobility Training, Transfer Training, Stair training, Pain Management, Positioning, Gait Training, Safety Education & Training, Endurance Training  Plan Comment: pt wants to ambulate stairs before d/c  Safety Devices  Type of devices: Call light within reach, Chair alarm in place, Patient at risk for falls, Left in chair (Wife present)    Goals  Short term goals  Time Frame for Short term goals: 2 weeks  Short term goal 1: Supine<>Stand w/ SBA  Short term goal 2: Sit<>Stand w/ SBA  Short term goal 3: Ambulate 200 ft w/ rw w/ CGA  Short term goal 4: Ambulate stairs w/ CGA       Therapy Time   Individual Concurrent Group Co-treatment   Time In           Time Out           Minutes                   [employer]     Electronically signed by [employer], Student PT on 6/22/2021 at 10:21 AM

## 2021-06-22 NOTE — OP NOTE
[facility] [employer] [facility] [address] [facility]                                OPERATIVE REPORT    PATIENT NAME: Josue Jay                 :        1950  MED REC NO:   [id number]                              ROOM:       [facility]  ACCOUNT NO:   [de-identified]                           ADMIT DATE: 2021  PROVIDER:     Waqar Garay MD    DATE OF PROCEDURE:  2021    PREOPERATIVE DIAGNOSIS:  Primary osteoarthritis, left knee. POSTOPERATIVE DIAGNOSIS:  Primary osteoarthritis, left knee. PROCEDURE PERFORMED:  Left total knee arthroplasty. SURGEON:  Waqar Garay MD    FIRST SURGICAL ASSISTANT:  Audrey Bey PA-C. Please note, he is a  critical assistant in exposure and placement of implants. ANESTHESIA:  Spinal with adductor canal block. EBL:  75 mL. FLUIDS:  1400 mL of crystalloid. URINE OUTPUT:  125 mL. TOURNIQUET TIME:  50 minutes. COMPONENTS USED:  Paddy Persona knee, femur is a size 10 CR TM  press-fit femur, tibia is a size G TM tibia which was cemented,  polyethylene is a size 10 MP polyethylene, patella size 38. INDICATIONS:  This is a 69-year-old gentleman with severe arthritis of  the left knee, failing conservative care. Because of this, he elected  for the above procedure. OPERATIVE PROCEDURE:  After informed consent, he was given 1 gm of  vancomycin and 900 mg clindamycin due to an Ancef allergy, underwent  adductor canal block and spinal anesthetic. Tourniquet was placed  around the left upper thigh. Right leg was placed in SCD. Levi  catheter was inserted. Left leg was prepped and draped in the usual  sterile fashion. Leg was Esmarched. Tourniquet was inflated to 200  mmHg. A midline incision was made. Parapatellar approach was made. Synovium on the distal femur was excised. The intramedullary canal of  the femur was drilled into.   A distal resection was made in 3 degrees of  valgus taking a +3 cut. Our medial resection was 12 mm and lateral  resection was 10 mm. We sized this to a size 10 femur. This was placed  in 5 degrees of external rotation. An anterior cut was made and  assessed. We moved the block down posteriorly by 2 mm. We repeated the  anterior cut and then did our posterior and chamfer cuts. Posteromedial  cut was 10 mm. Posterolateral cut was 3 mm. The tibia was exposed. The 7-degree cutting guide was placed in line with the anterior tibial  crest proximally and the middle of the ankle distally, and tibial  resection was made. We had equal flexion and extension gaps. We sized  the tibia to a size G tibia which was placed in line with the anterior  tibial crest.  A trial femur was placed. We were a little bit tight in  flexion. PCL was released with _____ flexion and extension gaps. We  had excellent flexion and extension, and a very well balanced and stable  knee. Lug holes were drilled for the tibia and the femur. The patella  measured 23 mm in thickness. The lateral facet release was done. We  used our reaming system. We sized this to a size 38 mm patella. Lug  holes were drilled and the trial button fit very nicely. We mixed 20 mL  of Exparel with 30 mL of saline and 50 mL of 0.25% Marcaine. We  injected the posteromedial capsule with 40 mL and 60 mL in the  subcutaneous tissues. We then irrigated with 1500 mL of normal saline  and dried the bone. One batch of [place] G cement was mixed. We  cemented the TM tibia by placing the cement on the cut surface of the  tibia and the undersurface of the tibial tray but left the post free of  any cement _____. The tibia had good purchase, but again I felt better  cementing some of the bone especially posteromedial itself. We then  press-fit the size 10 standard femur which had excellent purchase. Trial liner was placed and then the size 38 mm patella was cemented.    Once the cement had hardened, tourniquet was released and hemostasis was  achieved. We irrigated with another 1500 mL of normal saline. The  final size 10 MC liner was locked in the tibial tray. We had full  extension of the knee with perfect midline tracking of the patella, and  very stable and well balanced knee to varus and valgus stress in 0, 30  and 90 degrees of flexion. The parapatellar approach was then closed  with interrupted #1 Vicryl suture, 2-0 Vicryl suture for subcutaneous  tissues and 2-0 Vicryl for subcuticular stitch. Prineo, Dermabond and  soft dressings were applied. The patient was taken to the recovery room  in stable condition. POSTOPERATIVE PLANS:  1. He will be on our typical total knee arthroplasty protocol. 2.  He will be on 2 doses of vancomycin and 6 doses of clindamycin. 3.  He will be on Xarelto 10 mg a day for 21 days followed by a baby  aspirin 81 mg twice a day for another 3 weeks. Please note, his range of motion prior to surgery on the left knee was  10 to 108 degrees.         Anju Haile MD    D: 06/21/2021 13:34:46      T: 06/21/2021 18:52:28     [email or web address]  Job#: [id number]     Doc#: [id number]    CC:

## 2021-06-22 NOTE — CARE COORDINATION
Spoke with patient regarding MD orders for [place] services. Patient agreeable and has chosen [facility]. Referral Faxed. [address] [place] [address] [phone]. [phone]. Please notify [employer] when patient discharges and fax DC Summary,  DC med list and any new [place] orders. The Patient was provided with a choice of provider and agrees   with the discharge plan. [x] Yes [] No    Freedom of choice list was provided with basic dialogue that supports the patient's individualized plan of care/goals, treatment preferences and shares the quality data associated with the providers.  [x] Yes [] No  Electronically signed by Malachi Mathew on 6/22/2021 at 10:04 AM

## 2021-06-22 NOTE — CONSULTS
Referring Provider: Dr. Amanda Koo  Reason for Consultation: Medical management    Patient Care Team:  Marie Santana MD as PCP - General (Family Medicine)  Marie Santana MD as PCP - [address] [place] [address]  Empaneled Provider  NO TEAM MEMBERS    Chief complaint knee pain    Subjective . History of present illness: The patient presents to the orthopedic service for TKA. They have failed outpatient conservative treatment of NSAIDS, muscle relaxer, physical therapy and opioid pain meds. The pain is affecting their activities of daily living and they have chosen to undergo surgical correction. We have been asked to provide medical consultation by the attending physician since their primary care provider does not attend here at [address] [place] [address] in their healthcare during the perioperative phase was discussed with the patient. All questions were encouraged and answered to the best of our ability. The postoperative pain is as expected. There are no other participating or relieving factors noted. Pleasant 79-year-old gentleman followed by Dr. Georgi Tilley for general medical care needs. Extensive past medical history to include but not limited to carotid artery disease, RLS, diabetes mellitus type 2, thyroid disease, hypertension, malignant melanoma requiring radiation and neck dissection, chronic back pain and osteoarthritis. He underwent right total hip 2016 and left total hip in 2010. Postop day #1 left TKA. Pain is controlled. Ready and willing to work with therapy in anticipation for potential discharge home tomorrow.     REVIEW OF SYSTEMS:    CONSTITUTIONAL:  Negative for anorexia, chills, fevers, night sweats and weight loss  EYES:  negative for eye dryness, icterus and redness  HEENT:   negative for dental problems, epistaxis, facial trauma and thrush  RESPIRATORY:  negative for chest tightness, cough, dyspnea on exertion, pneumonia and sputum  CARDIOVASCULAR: negative for chest pain, dyspnea, exertional chest pressure/discomfort, irregular heart beat, palpitations, paroxysmal nocturnal dyspnea and syncope  GASTROINTESTINAL:  negative for abdominal pain, hematemesis, jaundice, melena and rectal bleeding  MUSCULOSKELETAL:  negative for muscle weakness, myalgias and neck pain, chronic joint pain noted  NEUROLOGICAL:   negative for dizziness, headaches, seizures, speech problems, tremors and vertigo  INTEGUMENT: negative for pruritus, rash, skin color change and skin lesion(s)   A Full 14 point review of systems is negative outside those listed above and in the HPI      History    Past Medical History:   Diagnosis Date    Acute pain of right hip     Arthritis     Cancer ([facility] [place] [facility] [address] 1998    MELANOMA REMOVED FROM LYMPH NODE IN RIGHT JAW    Carotid artery stenosis     Diabetes ([facility] [place] [facility] [address]     TYPE 2    Diabetes mellitus ([facility] [place] [facility] [address]     Hyperlipidemia     Hypertension     Knee pain     Osteoarthritis     Restless legs syndrome     Thyroid disease      Past Surgical History:   Procedure Laterality Date    BACK SURGERY  2013    LUMBAR/LASER CLEAN UP RUPTURED [address]    CAROTID ENDARTERECTOMY Right 8/5/15 [facility]    JOINT REPLACEMENT Left 2010    THR    OTHER SURGICAL HISTORY      radiation and neck dissection for melanoma    TOTAL HIP ARTHROPLASTY Right 8/22/2016    HIP TOTAL ARTHROPLASTY ANTERIOR APPROACH performed by Tricia Lerner MD at [address] [place] [address] Left 6/21/2021    LEFT TOTAL KNEE REPLACEMENT performed by Tricia Lerner MD at [facility]     Family History   Problem Relation Age of Onset    No Known Problems Mother     No Known Problems Father     Cancer Brother         LUNG CA    Mental Retardation Brother         PARANOID SCHIZOPHRENIA     Social History     Tobacco Use    Smoking status: Never Smoker    Smokeless tobacco: Never Used   Substance Use Topics    Alcohol use: No     Comment: HX OF ETOH ABUSE    Drug use: No     Medications Prior to Admission: levothyroxine (SYNTHROID) 100 MCG tablet, Take 100 mcg by mouth Daily  metoprolol tartrate (LOPRESSOR) 50 MG tablet, Take 50 mg by mouth 2 times daily  acetaminophen (TYLENOL) 500 MG tablet, Take 500 mg by mouth every 6 hours as needed for Pain  glipiZIDE (GLUCOTROL) 5 MG ER tablet, Take 5 mg by mouth daily  metFORMIN ER, MOD, (GLUMETZA) 500 MG ER tablet, Take 500 mg by mouth 2 times daily (with meals)   aspirin 81 MG tablet, Take 81 mg by mouth daily  diphenhydrAMINE (BENADRYL) 25 MG tablet, Take 25 mg by mouth nightly as needed for Itching  Omega-3 Fatty Acids (FISH OIL) 1000 MG CAPS, Take 1,000 mg by mouth daily  Multiple Vitamins-Minerals (MULTIVITAMIN ADULTS 50+) TABS, Take 1 tablet by mouth daily   Coenzyme Q10 (COQ10) 200 MG CAPS, Take 1 capsule by mouth daily   Cholecalciferol (VITAMIN D-3) 1000 UNITS CAPS, Take 1 capsule by mouth daily   Ancef [cefazolin], Hydrocodone-acetaminophen, and Tape [adhesive tape]  Objective     Vital Signs   All pre-op and post op vitals reviewed           Physical Exam:  Constitutional: oriented to person, place, and time. appears well-developed. HEENT:   Head: Normocephalic and atraumatic. Eyes: Pupils are equal, round, and reactive to light. Neck: Neck supple. Cardiovascular: Regular rhythm and normal heart sounds. No lift or rub appreciated. Pulmonary/Chest: Effort normal and breath sounds normal. CTAB. No labored breating. Abdominal: Soft. Bowel sounds are normal. There is no appreciable  distension. There is no point tenderness. no rebounding or guarding. Musculoskeletal: Normal range of motion on other than surgically repaired joint . no edema or tenderness other than surgical area. Post-op changes noted  Neurological:  alert and oriented to person, place, and time. normal reflexes. No focal deficits  Skin: Skin is warm and dry. No new rashes appreciated. Results Review:   I reviewed the patient's new imaging results and agree with the interpretation.       Active Problems: Treatment recommendations Arthritis of knee--- postop care    Hypertension-review pre and post BP's,will restart home BP meds when BP allows. Add Clonidine 0.1 mg q 4 hours prn if bp> 140/90,monitor BP and adjust meds as necessary. Continue Lopressor. Initiate low-dose ACE inhibitor for renal protection and known diabetic. Constipation-start with Miralax 1 capful BID until BM, then decrease to 1 x a day,then can step up to prokinetic to aid in opiod induced constipation,and ultimately end up with Relistor 12 mcg sub Q q 48 hours to block the effect of narcotics on the gut. Diabetes mellitus type 2--blood sugars noted. Explained to patient the need for better control to optimize the healing process. Reviewed home medication regimen, IV fluids, and dietary intake over the last 24 hours to help determine the etiology of the hyperglycemia. Adjustments made and discussed with staff, see orders for further details. A1c normal.  Continue metformin and Glucotrol     Anemia post-op expected-check iron, B12,and folate if indicated. Replenish substrates as needed. Transfuse at acceptable levels depending on clinical judgement and comorbidities. Recent hospital policy recommends 1 unit at a time. Recheck hemoglobin in AM if patient remains in house, if D/C ed advise close follow up with PCP to ensure levels return to baseline. MARIAH--preop creatinine 0.6, maintain normal pressure, IV fluid hydration, avoid nephrotoxic medications, follow along with daily labs. Medically stable postop day #1  Encourage incentive spirometry every hour while awake  Wound care monitor for infection  Early mobilization, maximize efforts with therapy. Follow along with daily labs    I discussed the patients findings and my recommendations with patient/family, staff and the Orthopaedic team.  Diogo Canela PA-C  06/22/21  7:22 AM    Type 2 diabetes-excellent hemoglobin A1c. Patient followed by Dr. Ana Whaley. The patient was seen on rounds today.  Reviewed the last

## 2021-06-22 NOTE — PROGRESS NOTES
Subjective:     Post-Operative Day: 1 Status Post left tka  Systemic or Specific Complaints:No Complaints  no nausea Pain 5    Objective:     Patient Vitals for the past 24 hrs:   BP Temp Temp src Pulse Resp SpO2 Height Weight   06/22/21 0637 131/76 97 °F (36.1 °C) Temporal 70 18 94 % -- --   06/22/21 0355 125/77 96.9 °F (36.1 °C) Temporal 67 16 92 % -- --   06/21/21 2307 135/79 96.8 °F (36 °C) Temporal 74 18 93 % -- --   06/21/21 1945 137/65 97.3 °F (36.3 °C) Temporal 79 16 96 % -- --   06/21/21 1644 123/79 96.8 °F (36 °C) Temporal 66 16 92 % -- --   06/21/21 1538 120/75 97.1 °F (36.2 °C) Temporal 68 20 96 % -- --   06/21/21 1457 132/80 96 °F (35.6 °C) -- 68 20 95 % -- --   06/21/21 1418 138/79 96.2 °F (35.7 °C) Temporal 61 20 96 % -- --   06/21/21 1347 129/80 96.1 °F (35.6 °C) Temporal 58 12 95 % -- --   06/21/21 1322 -- -- -- 62 -- -- -- --   06/21/21 1320 105/80 -- -- 61 19 96 % -- --   06/21/21 1310 118/75 -- -- 64 11 92 % -- --   06/21/21 1305 135/70 -- -- 63 11 90 % -- --   06/21/21 1300 96/61 -- -- 63 12 90 % -- --   06/21/21 1259 -- -- -- 66 -- -- -- --   06/21/21 1257 116/68 97.4 °F (36.3 °C) -- 64 11 93 % -- --   06/21/21 1255 -- -- -- 69 16 94 % -- --   06/21/21 1254 116/68 97.4 °F (36.3 °C) Temporal 63 11 91 % -- --   06/21/21 0817 (!) 143/91 97.1 °F (36.2 °C) Temporal 63 16 96 % 5' 11\" (1.803 m) 230 lb (104.3 kg)       General: alert, appears stated age and cooperative   Exam: Incision clean, dry, and intact, no evidence of infection. Neurovascular: Exam normal       Data Review:  Recent Labs     06/22/21  0345   HGB 12.5*     Recent Labs     06/22/21  0345      K 4.5   CREATININE 0.7     Recent Labs     06/22/21  0345   LABGLOM >60           Assessment:     Status Post left tka. Plan:     Continues current post-op course.       Electronically signed by Anders Powers MD on 6/22/2021 at 7:17 AM

## 2021-06-23 VITALS
DIASTOLIC BLOOD PRESSURE: 88 MMHG | OXYGEN SATURATION: 92 % | BODY MASS INDEX: 32.2 KG/M2 | RESPIRATION RATE: 16 BRPM | SYSTOLIC BLOOD PRESSURE: 136 MMHG | TEMPERATURE: 97.5 F | HEIGHT: 71 IN | HEART RATE: 70 BPM | WEIGHT: 230 LBS

## 2021-06-23 PROBLEM — M17.12 PRIMARY OSTEOARTHRITIS OF LEFT KNEE: Status: ACTIVE | Noted: 2021-06-21

## 2021-06-23 LAB
ANION GAP SERPL CALCULATED.3IONS-SCNC: 8 MMOL/L (ref 7–19)
BUN BLDV-MCNC: 12 MG/DL (ref 8–23)
CALCIUM SERPL-MCNC: 8.4 MG/DL (ref 8.8–10.2)
CHLORIDE BLD-SCNC: 103 MMOL/L (ref 98–111)
CO2: 27 MMOL/L (ref 22–29)
CREAT SERPL-MCNC: 0.7 MG/DL (ref 0.5–1.2)
GFR AFRICAN AMERICAN: >59
GFR NON-AFRICAN AMERICAN: >60
GLUCOSE BLD-MCNC: 192 MG/DL (ref 74–109)
GLUCOSE BLD-MCNC: 193 MG/DL (ref 70–99)
HBA1C MFR BLD: 6 % (ref 4–6)
HCT VFR BLD CALC: 39.1 % (ref 42–52)
HEMOGLOBIN: 13 G/DL (ref 14–18)
IRON SATURATION: 7 % (ref 14–50)
IRON: 19 UG/DL (ref 59–158)
PERFORMED ON: ABNORMAL
POTASSIUM REFLEX MAGNESIUM: 4.5 MMOL/L (ref 3.5–5)
SODIUM BLD-SCNC: 138 MMOL/L (ref 136–145)
TOTAL IRON BINDING CAPACITY: 282 UG/DL (ref 250–400)
VITAMIN B-12: 601 PG/ML (ref 211–946)

## 2021-06-23 PROCEDURE — 80048 BASIC METABOLIC PNL TOTAL CA: CPT

## 2021-06-23 PROCEDURE — 83036 HEMOGLOBIN GLYCOSYLATED A1C: CPT

## 2021-06-23 PROCEDURE — 83550 IRON BINDING TEST: CPT

## 2021-06-23 PROCEDURE — 2580000003 HC RX 258: Performed by: ORTHOPAEDIC SURGERY

## 2021-06-23 PROCEDURE — 82947 ASSAY GLUCOSE BLOOD QUANT: CPT

## 2021-06-23 PROCEDURE — 85018 HEMOGLOBIN: CPT

## 2021-06-23 PROCEDURE — 85014 HEMATOCRIT: CPT

## 2021-06-23 PROCEDURE — 82607 VITAMIN B-12: CPT

## 2021-06-23 PROCEDURE — 83540 ASSAY OF IRON: CPT

## 2021-06-23 PROCEDURE — 6370000000 HC RX 637 (ALT 250 FOR IP): Performed by: ORTHOPAEDIC SURGERY

## 2021-06-23 PROCEDURE — 2500000003 HC RX 250 WO HCPCS: Performed by: ORTHOPAEDIC SURGERY

## 2021-06-23 PROCEDURE — 36415 COLL VENOUS BLD VENIPUNCTURE: CPT

## 2021-06-23 RX ORDER — OXYCODONE HYDROCHLORIDE 10 MG/1
10 TABLET ORAL EVERY 4 HOURS PRN
Qty: 60 TABLET | Refills: 0 | Status: SHIPPED | OUTPATIENT
Start: 2021-06-23 | End: 2021-06-23

## 2021-06-23 RX ORDER — OXYCODONE HYDROCHLORIDE 10 MG/1
10 TABLET ORAL EVERY 4 HOURS PRN
Qty: 60 TABLET | Refills: 0 | Status: SHIPPED | OUTPATIENT
Start: 2021-06-23 | End: 2021-07-23

## 2021-06-23 RX ADMIN — DOCUSATE SODIUM 50 MG AND SENNOSIDES 8.6 MG 1 TABLET: 8.6; 5 TABLET, FILM COATED ORAL at 08:47

## 2021-06-23 RX ADMIN — OXYCODONE HYDROCHLORIDE 10 MG: 10 TABLET, FILM COATED, EXTENDED RELEASE ORAL at 08:47

## 2021-06-23 RX ADMIN — Medication 1000 UNITS: at 08:47

## 2021-06-23 RX ADMIN — Medication 1 TABLET: at 08:47

## 2021-06-23 RX ADMIN — TAMSULOSIN HYDROCHLORIDE 0.4 MG: 0.4 CAPSULE ORAL at 08:47

## 2021-06-23 RX ADMIN — LEVOTHYROXINE SODIUM 100 MCG: 100 TABLET ORAL at 07:39

## 2021-06-23 RX ADMIN — GLIPIZIDE 5 MG: 5 TABLET ORAL at 07:39

## 2021-06-23 RX ADMIN — OXYCODONE 10 MG: 5 TABLET ORAL at 04:19

## 2021-06-23 RX ADMIN — INSULIN LISPRO 1 UNITS: 100 INJECTION, SOLUTION INTRAVENOUS; SUBCUTANEOUS at 08:06

## 2021-06-23 RX ADMIN — ACETAMINOPHEN 650 MG: 325 TABLET ORAL at 08:06

## 2021-06-23 RX ADMIN — METFORMIN HYDROCHLORIDE 500 MG: 500 TABLET, EXTENDED RELEASE ORAL at 08:05

## 2021-06-23 RX ADMIN — TRAMADOL HYDROCHLORIDE 50 MG: 50 TABLET, FILM COATED ORAL at 07:39

## 2021-06-23 RX ADMIN — CLINDAMYCIN IN 5 PERCENT DEXTROSE 900 MG: 18 INJECTION, SOLUTION INTRAVENOUS at 04:18

## 2021-06-23 RX ADMIN — POLYETHYLENE GLYCOL 3350 17 G: 17 POWDER, FOR SOLUTION ORAL at 08:47

## 2021-06-23 RX ADMIN — OXYCODONE 10 MG: 5 TABLET ORAL at 00:11

## 2021-06-23 RX ADMIN — SODIUM CHLORIDE, PRESERVATIVE FREE 10 ML: 5 INJECTION INTRAVENOUS at 08:49

## 2021-06-23 RX ADMIN — METOPROLOL TARTRATE 50 MG: 50 TABLET, FILM COATED ORAL at 08:47

## 2021-06-23 ASSESSMENT — PAIN SCALES - GENERAL
PAINLEVEL_OUTOF10: 8
PAINLEVEL_OUTOF10: 8
PAINLEVEL_OUTOF10: 5
PAINLEVEL_OUTOF10: 2
PAINLEVEL_OUTOF10: 4
PAINLEVEL_OUTOF10: 8

## 2021-06-23 ASSESSMENT — PAIN DESCRIPTION - ORIENTATION
ORIENTATION: LEFT
ORIENTATION: LEFT

## 2021-06-23 ASSESSMENT — PAIN DESCRIPTION - DESCRIPTORS
DESCRIPTORS: SORE
DESCRIPTORS: SORE

## 2021-06-23 ASSESSMENT — PAIN DESCRIPTION - ONSET
ONSET: GRADUAL
ONSET: ON-GOING

## 2021-06-23 ASSESSMENT — PAIN DESCRIPTION - PAIN TYPE
TYPE: SURGICAL PAIN
TYPE: SURGICAL PAIN

## 2021-06-23 ASSESSMENT — PAIN DESCRIPTION - LOCATION
LOCATION: KNEE
LOCATION: KNEE

## 2021-06-23 ASSESSMENT — PAIN DESCRIPTION - PROGRESSION
CLINICAL_PROGRESSION: GRADUALLY WORSENING
CLINICAL_PROGRESSION: GRADUALLY WORSENING

## 2021-06-23 ASSESSMENT — PAIN DESCRIPTION - FREQUENCY
FREQUENCY: INTERMITTENT
FREQUENCY: INTERMITTENT

## 2021-06-23 NOTE — PROGRESS NOTES
06/23/21 1012   Subjective   Subjective I don't want to walk or practice steps   Physical Therapy    Electronically signed by Jessica Linton PTA on 6/23/2021 at 10:14 AM

## 2021-06-23 NOTE — PROGRESS NOTES
Subjective:     Post-Operative Day: 2 Status Post left tka. Pt is awake and alert. Ox3. He is doing well this am.  He has no new issues. He was able to ambulate 100 ft with PT. Systemic or Specific Complaints: No Complaints  no nausea Pain 2    Objective:     Patient Vitals for the past 24 hrs:   BP Temp Temp src Pulse Resp SpO2   06/23/21 0658 136/88 97.5 °F (36.4 °C) Temporal 70 16 92 %   06/23/21 0419 139/78 97.5 °F (36.4 °C) Temporal 69 20 92 %   06/23/21 0006 139/78 97.6 °F (36.4 °C) Temporal 68 18 93 %   06/22/21 1956 131/74 97.4 °F (36.3 °C) Temporal 73 18 92 %   06/22/21 1516 131/73 97.4 °F (36.3 °C) Temporal 73 16 94 %   06/22/21 1049 135/77 97.5 °F (36.4 °C) Temporal 67 16 95 %       General: alert, appears stated age and cooperative   Exam: Incision clean, dry, and intact, no evidence of infection. He has good active motion to the left lower extremity. Neurovascular: Exam normal       Data Review:  Recent Labs     06/22/21  0345 06/23/21  0429   HGB 12.5* 13.0*     Recent Labs     06/23/21  0429      K 4.5   CREATININE 0.7     Recent Labs     06/23/21 0429   LABGLOM >60           Assessment:     Status Post left tka. Plan:     Continue current post-op course. He is ready for home. He will have home health as per total knee protocol. He may f/u in office in 6 weeks.       Electronically signed by Oli Manriquez PA-C on 6/23/2021 at 8:09 AM

## 2021-06-23 NOTE — PROGRESS NOTES
Patient discharged home today with [facility]. Facility notified of patient's discharge and all documents were faxed. P ; F .   Electronically signed by Niels Joy RN on 6/23/2021 at 12:15 PM

## 2021-06-23 NOTE — PROGRESS NOTES
Physician Progress Note      Guerita Mello  LYUBOV #:                  [id number]  :                       1950  ADMIT DATE:       2021 8:00 AM  [address] [place] DATE:  RESPONDING  PROVIDER #:        Columba Mello MD          QUERY TEXT:    Patient admitted with left knee replacement. Noted documentation of Acute   Kidney Injury in Dr. Roderick Costa note . .  In order to support the diagnosis   of MARIAH, please include additional clinical indicators in your documentation. Or please document if the diagnosis of MARIAH has been ruled out after further   study    The medical record reflects the following:  Risk Factors: Surgery,  HTN  Clinical Indicators :creatinine 0.6 to 0.7. Treatment: labs,  ml/hr. Thank you    Adriana Orourke RN, BSN, [facility]  [phone]    Defined by Kidney Disease Improving Global Outcomes (KDIGO) clinical practice   guideline for acute kidney injury:  -Increase in SCr by greater than or equal to 0.3 mg/dl within 48 hours; or  -Increase or decrease in SCr to greater than or equal to 1.5 times baseline,   which is known or presumed to have occurred within the prior 7 days; or  -Urine volume < 0.5ml/kg/h for 6 hours  Options provided:  -- Acute kidney injury evidenced by, Please document evidence as well as   baseline creatinine, if known. -- Currently resolved acute kidney injury was evidenced by, Please document   evidence as well as baseline creatinine, if known. -- Acute kidney injury ruled out after study  -- Other - I will add my own diagnosis  -- Disagree - Not applicable / Not valid  -- Disagree - Clinically unable to determine / Unknown  -- Refer to Clinical Documentation Reviewer    PROVIDER RESPONSE TEXT:    Acute kidney injury was ruled out after study.     Query created by: Rob Fenton on 2021 1:53 PM      Electronically signed by:  Columba Mello MD 2021 9:39 PM

## 2021-06-23 NOTE — DISCHARGE SUMMARY
[facility] [address] [place] [address]  Dr. Vicki Castellano  Discharge Summary     Promise Herrera is a 66 y.o. male underwent left total knee replacement procedure without complication. Promise Herrera was admitted to the floor following his   recovery in the PACU.      Discharge Diagnosis   Left Knee Replacement    Current Inpatient Medications    Current Facility-Administered Medications: metFORMIN (GLUCOPHAGE-XR) extended release tablet 500 mg, 500 mg, Oral, BID WC  diphenhydrAMINE (BENADRYL) tablet 25 mg, 25 mg, Oral, Nightly PRN  therapeutic multivitamin-minerals 1 tablet, 1 tablet, Oral, Daily  Co Q-10 CAPS 200 mg, 200 mg, Oral, Daily  Vitamin D (CHOLECALCIFEROL) tablet 1,000 Units, 1,000 Units, Oral, Daily  levothyroxine (SYNTHROID) tablet 100 mcg, 100 mcg, Oral, Daily  metoprolol tartrate (LOPRESSOR) tablet 50 mg, 50 mg, Oral, BID  glucose (GLUTOSE) 40 % oral gel 15 g, 15 g, Oral, PRN  dextrose 50 % IV solution, 12.5 g, Intravenous, PRN  glucagon (rDNA) injection 1 mg, 1 mg, Intramuscular, PRN  dextrose 5 % solution, 100 mL/hr, Intravenous, PRN  0.9 % sodium chloride infusion, , Intravenous, Continuous  0.9 % sodium chloride bolus, 500 mL, Intravenous, PRN  sodium chloride flush 0.9 % injection 10 mL, 10 mL, Intravenous, 2 times per day  sodium chloride flush 0.9 % injection 10 mL, 10 mL, Intravenous, PRN  0.9 % sodium chloride infusion, 25 mL, Intravenous, PRN  acetaminophen (TYLENOL) tablet 650 mg, 650 mg, Oral, Q6H  sennosides-docusate sodium (SENOKOT-S) 8.6-50 MG tablet 1 tablet, 1 tablet, Oral, BID  magnesium hydroxide (MILK OF MAGNESIA) 400 MG/5ML suspension 30 mL, 30 mL, Oral, Daily PRN  ondansetron (ZOFRAN-ODT) disintegrating tablet 4 mg, 4 mg, Oral, Q8H PRN **OR** ondansetron (ZOFRAN) injection 4 mg, 4 mg, Intravenous, Q6H PRN  rivaroxaban (XARELTO) tablet 10 mg, 10 mg, Oral, Daily  insulin lispro (HUMALOG) injection vial 0-6 Units, 0-6 Units, Subcutaneous, TID WC  insulin lispro (HUMALOG) injection vial 0-3 Units, 0-3 Units, Subcutaneous, Nightly  oxyCODONE (OXYCONTIN) extended release tablet 10 mg, 10 mg, Oral, 2 times per day  oxyCODONE (ROXICODONE) immediate release tablet 10 mg, 10 mg, Oral, Q4H PRN **OR** oxyCODONE (ROXICODONE) immediate release tablet 20 mg, 20 mg, Oral, Q4H PRN  traMADol (ULTRAM) tablet 50 mg, 50 mg, Oral, Q6H  HYDROmorphone HCl PF (DILAUDID) injection 1 mg, 1 mg, Intravenous, Q3H PRN **OR** HYDROmorphone HCl PF (DILAUDID) injection 2 mg, 2 mg, Intravenous, Q3H PRN  diphenhydrAMINE (BENADRYL) tablet 25 mg, 25 mg, Oral, Q6H PRN  clindamycin (CLEOCIN) 900 mg in dextrose 5 % 50 mL IVPB, 900 mg, Intravenous, Q8H  tamsulosin (FLOMAX) capsule 0.4 mg, 0.4 mg, Oral, Daily  lactated ringers infusion, , Intravenous, Continuous  glipiZIDE (GLUCOTROL) tablet 5 mg, 5 mg, Oral, QAM AC  HYDROmorphone HCl PF (DILAUDID) injection 0.5 mg, 0.5 mg, Intravenous, Q3H PRN **OR** [DISCONTINUED] HYDROmorphone HCl PF (DILAUDID) injection 1 mg, 1 mg, Intravenous, Q3H PRN  polyethylene glycol (GLYCOLAX) packet 17 g, 17 g, Oral, Daily    Post-operatively the patients diet was advanced as tolerated and their incision was checked on POD #1. The incision was clean, dry and intact with no signs of infection. The patient remained neurovascularly intact in the lower extremity and had intact pulses distally. Patients calf remained soft and showed no evidence of DVT. The patient was able to move his left leg and ankle/foot without any problems post-operatively. Physical therapy and occupational therapy were consulted and began working with the patient post-operatively. The patient progressed with PT/OT as would be expected and continued to improve through their stay. The patients pain was initially controlled with IV medications but we were able to transition to oral pain medications soon after arrival to the floor and their pain remained under good control through their hospital stay.   From a medical standpoint the patient remained stable and continued to have the medicine team follow throughout their stay. Acute postoperative blood loss anemia after joint replacement being monitored with daily hemoglobin/hematocrit. The patients dressing was changed/incison was checked on day of d/c. The patient will be discharged at this time to home with home health as per total knee protocol with their current diet restrictions and will continue to follow the total knee precautions outlined to them by us and PT/OT. Condition on Discharge: Stable    Acute blood loss anemia ruled out    Plan  Followup at scheduled appointment time (1 month post-op). Patient was instructed on the use of pain medications, the signs and symptoms of infection, and was given our number to call should they have any questions or concerns following discharge.

## 2021-06-23 NOTE — PROGRESS NOTES
[facility]  Daily Progress Note  Holland Pruitt  MRN: [id number] LOS: 2    Admit Date: 6/21/2021 6/23/2021 7:36 AM          Chief Complaint:  Left knee pain    Interval History:    Reviewed overnight events and nursing notes  Postop pain is controlled  Denies chest pain  No shortness of breath  Appetite and bowel function returning. DIET:  ADULT DIET; Regular; 3 carb choices (45 gm/meal)    Medications:      dextrose      sodium chloride Stopped (06/22/21 2215)    sodium chloride      lactated ringers 75 mL/hr at 06/21/21 0841      metFORMIN (MOD)  500 mg Oral BID WC    therapeutic multivitamin-minerals  1 tablet Oral Daily    Co Q-10  200 mg Oral Daily    Vitamin D  1,000 Units Oral Daily    levothyroxine  100 mcg Oral Daily    metoprolol tartrate  50 mg Oral BID    sodium chloride flush  10 mL Intravenous 2 times per day    acetaminophen  650 mg Oral Q6H    sennosides-docusate sodium  1 tablet Oral BID    rivaroxaban  10 mg Oral Daily    insulin lispro  0-6 Units Subcutaneous TID WC    insulin lispro  0-3 Units Subcutaneous Nightly    oxyCODONE  10 mg Oral 2 times per day    traMADol  50 mg Oral Q6H    clindamycin (CLEOCIN) IV  900 mg Intravenous Q8H    tamsulosin  0.4 mg Oral Daily    glipiZIDE  5 mg Oral QAM AC    polyethylene glycol  17 g Oral Daily       Data:     Code Status: Full Code    Family History   Problem Relation Age of Onset    No Known Problems Mother     No Known Problems Father     Cancer Brother         LUNG CA    Mental Retardation Brother         PARANOID SCHIZOPHRENIA     Social History     Socioeconomic History    Marital status:      Spouse name: Not on file    Number of children: Not on file    Years of education: Not on file    Highest education level: Not on file   Occupational History    Not on file   Tobacco Use    Smoking status: Never Smoker    Smokeless tobacco: Never Used   Substance and Sexual Activity    Alcohol use:  No WILLARD 185* 185* 194* 193*     Physical Examination:   General appearance - alert, well appearing, and in no distress and oriented to person, place, and time  Mouth - mucous membranes moist, pharynx normal without lesions  Neck - supple, no significant adenopathy  Lymphatics - no palpable lymphadenopathy, no hepatosplenomegaly  Chest - clear to auscultation, no wheezes, rales or rhonchi, symmetric air entry, no tachypnea, retractions or cyanosis  Heart - normal rate, regular rhythm, normal S1, S2, no murmurs, rubs, clicks or gallops  Abdomen - soft, nontender, nondistended, no masses or organomegaly bowel sounds normal  Neurological - alert, oriented, normal speech, no focal findings or movement disorder noted  Musculoskeletal - no joint tenderness, deformity or swelling  Extremities - peripheral pulses normal, no pedal edema, no clubbing or cyanosis  Skin - normal coloration and turgor, no rashes, no suspicious skin lesions noted      Assessment and Plan:       Hospital Problem list/ Treatment recommendations:  Principal Problem:    Primary osteoarthritis of left knee--postop care    Hypertension--Continue Lopressor. Initiate low-dose ACE inhibitor for renal protection and known diabetic.     Constipation--continue with bowel regimen     Diabetes mellitus type 2--stable,  A1c 6.0. Continue metformin and Glucotrol      Anemia post-op expected--hemoglobin 13, iron a bit low, recommend over-the-counter multivitamin with iron     Elevated creatinine--resolved     Medically stable postop day #2  Encourage incentive spirometry every hour while awake  Wound care monitor for infection  Early mobilization, maximize efforts with therapy. Follow along with daily labs    Discharge planning: Home, medically stable    Reviewed treatment plans with the patient and  nursing staff  20 minutes spent in face to face interaction and coordination of care.      Electronically signed by Jayce Whittaker PA-C on 6/23/2021 at 7:36 AM Looks good postop day #2 and ready for discharge home. Close follow-up with Dr. Ana Whaley within the week for TCM/Medicare hospital follow-up. I have discussed the care of [employer] Yessi, including pertinent history and exam findings with the ARNP/PA. I have seen and examined the patient and the key elements of all parts of the encounter have been performed by me. I agree with the assessment and plan as outlined by the ARNP/PA. Please refer to my separate note for complete documentation.      Electronically signed by Beulah Collet, MD on 6/23/2021 at 8:43 AM

## 2021-06-23 NOTE — PROGRESS NOTES
Patient discharged home today with [facility]. Medications and discharge instructions reviewed with patient. A handout of all new medications was given to the patient for reference with all possible side effects highlighted. Patient verbalized understanding. Patient stable upon discharge.   Electronically signed by Heriberto Coleman RN on 6/23/2021 at 12:14 PM

## 2021-06-24 ENCOUNTER — TELEPHONE (OUTPATIENT)
Dept: INPATIENT UNIT | Age: 71
End: 2021-06-24

## 2021-08-09 ENCOUNTER — OFFICE VISIT (OUTPATIENT)
Dept: VASCULAR SURGERY | Age: 71
End: 2021-08-09
Payer: MEDICARE

## 2021-08-09 ENCOUNTER — HOSPITAL ENCOUNTER (OUTPATIENT)
Dept: VASCULAR LAB | Age: 71
Discharge: HOME | End: 2021-08-09
Payer: MEDICARE

## 2021-08-09 VITALS
SYSTOLIC BLOOD PRESSURE: 122 MMHG | HEART RATE: 61 BPM | TEMPERATURE: 97.8 F | HEIGHT: 71 IN | BODY MASS INDEX: 30.8 KG/M2 | WEIGHT: 220 LBS | RESPIRATION RATE: 16 BRPM | DIASTOLIC BLOOD PRESSURE: 72 MMHG | OXYGEN SATURATION: 96 %

## 2021-08-09 DIAGNOSIS — I65.23 BILATERAL CAROTID ARTERY STENOSIS: Primary | ICD-10-CM

## 2021-08-09 DIAGNOSIS — I65.23 BILATERAL CAROTID ARTERY STENOSIS: ICD-10-CM

## 2021-08-09 PROCEDURE — 93880 EXTRACRANIAL BILAT STUDY: CPT

## 2021-08-09 PROCEDURE — 3017F COLORECTAL CA SCREEN DOC REV: CPT | Performed by: PHYSICIAN ASSISTANT

## 2021-08-09 PROCEDURE — 1123F ACP DISCUSS/DSCN MKR DOCD: CPT | Performed by: PHYSICIAN ASSISTANT

## 2021-08-09 PROCEDURE — G8417 CALC BMI ABV UP PARAM F/U: HCPCS | Performed by: PHYSICIAN ASSISTANT

## 2021-08-09 PROCEDURE — 1036F TOBACCO NON-USER: CPT | Performed by: PHYSICIAN ASSISTANT

## 2021-08-09 PROCEDURE — G8427 DOCREV CUR MEDS BY ELIG CLIN: HCPCS | Performed by: PHYSICIAN ASSISTANT

## 2021-08-09 PROCEDURE — 4040F PNEUMOC VAC/ADMIN/RCVD: CPT | Performed by: PHYSICIAN ASSISTANT

## 2021-08-09 PROCEDURE — 99213 OFFICE O/P EST LOW 20 MIN: CPT | Performed by: PHYSICIAN ASSISTANT

## 2021-08-09 NOTE — PROGRESS NOTES
Patient Care Team:  Sheng Kelly MD as PCP - General (Family Medicine)  Sheng Kelly MD as PCP - REHABILITATION [facility]  NO TEAM MEMBERS      History and Physical:    Wagner Beltrán is a 66 y.o. male who has a history of carotid artery stenosis. Today we are following up for this. He is on an aspirin daily he also reports that he is taking a statin daily however he cannot recall the name of this. He reports that he normally breaks this in half due to the fact that it does cause some elevation of his blood sugar.       Wagner Beltrán is a 66 y.o. male with the following history reviewed and recorded in [facility]:  Patient Active Problem List    Diagnosis Date Noted    Primary osteoarthritis of left knee 06/21/2021    Carotid artery stenosis     Hyperlipidemia     Hypertension     Restless legs syndrome     Diabetes mellitus ([facility]     Arthritis     Thyroid disease     Diabetes ([facility]      TYPE 2      AVN (avascular necrosis of bone) ([facility] 08/20/2016    Cancer ([facility] 01/01/1998     MELANOMA REMOVED FROM LYMPH NODE IN RIGHT JAW       Current Outpatient Medications   Medication Sig Dispense Refill    apixaban (ELIQUIS) 2.5 MG TABS tablet Take 1 tablet by mouth 2 times daily 38 tablet 0    levothyroxine (SYNTHROID) 100 MCG tablet Take 100 mcg by mouth Daily      metoprolol tartrate (LOPRESSOR) 50 MG tablet Take 50 mg by mouth 2 times daily      acetaminophen (TYLENOL) 500 MG tablet Take 500 mg by mouth every 6 hours as needed for Pain      glipiZIDE (GLUCOTROL) 5 MG ER tablet Take 5 mg by mouth daily      metFORMIN ER, MOD, (GLUMETZA) 500 MG ER tablet Take 500 mg by mouth 2 times daily (with meals)       diphenhydrAMINE (BENADRYL) 25 MG tablet Take 25 mg by mouth nightly as needed for Itching      Omega-3 Fatty Acids (FISH OIL) 1000 MG CAPS Take 1,000 mg by mouth daily      Multiple Vitamins-Minerals (MULTIVITAMIN ADULTS 50+) TABS Take 1 tablet by mouth daily       Coenzyme Q10 (COQ10) 200 MG CAPS Take 1 capsule by mouth daily       Cholecalciferol (VITAMIN D-3) 1000 UNITS CAPS Take 1 capsule by mouth daily        No current facility-administered medications for this visit. Allergies: Ancef [cefazolin], Hydrocodone-acetaminophen, and Tape [adhesive tape]  Past Medical History:   Diagnosis Date    Acute pain of right hip     Arthritis     Cancer ([facility] 1998    MELANOMA REMOVED FROM LYMPH NODE IN RIGHT JAW    Carotid artery stenosis     Diabetes ([facility]     TYPE 2    Diabetes mellitus ([facility]     Hyperlipidemia     Hypertension     Knee pain     Osteoarthritis     Restless legs syndrome     Thyroid disease      Past Surgical History:   Procedure Laterality Date    BACK SURGERY  2013    LUMBAR/LASER CLEAN UP RUPTURED DISC    CAROTID ENDARTERECTOMY Right 8/5/15 P[facility]rez    JOINT REPLACEMENT Left 2010    THR    OTHER SURGICAL HISTORY      radiation and neck dissection for melanoma    TOTAL HIP ARTHROPLASTY Right 8/22/2016    HIP TOTAL ARTHROPLASTY ANTERIOR APPROACH performed by Amalia Badillo MD at [address] [place] [address] Left 6/21/2021    LEFT TOTAL KNEE REPLACEMENT performed by Amalia Badillo MD at [facility] OR     Family History   Problem Relation Age of Onset    No Known Problems Mother     No Known Problems Father     Cancer Brother         LUNG CA    Mental Retardation Brother         PARANOID SCHIZOPHRENIA     Social History     Tobacco Use    Smoking status: Never Smoker    Smokeless tobacco: Never Used   Substance Use Topics    Alcohol use: No     Comment: HX OF ETOH ABUSE       Old records have been obtained from the referring provider. These records have been reviewed and summarized. Review of Systems    Constitutional    No fever or chills   HENT  no HA's, tinnitus, rhinorrhea, sore throat  Eyes  no sudden vision change or amaurosis. Respiratory  SOB or chest pain  Cardiovascular  no chest pain, syncope, or significant dizziness.   No palpitations Family history, tobacco abuse in all forms, elevated cholesterol, hyperlipidemia, and diabetes. Carotid Doppler results:    Right CCA/ICA <50% stenotic  Left CCA/ICA <50% stenotic  Right vertebral artery flow is antegrade  Left vertebral artery flow is antegrade  Individual velocities reviewed: Yes. Results were reviewed with the patient. Assessment      1. Bilateral carotid artery stenosis          Plan      Recommend he continue ASA and a statin daily  Recommend no smoking  Recommend good BP and glycemic control  Recommend low fat, low cholesterol diet  Recommend daily exercise in moderation   We will follow-up in 12 months with a repeat carotid artery duplex scan. Patient was instructed to go to ER or call office immediately with any new onset of partial or complete loss of vision affecting only one eye, speech difficulty or lateralizing weakness, numbness/tingling    Thank you for allowing us to participate in the care of your patient         Please note that parts of the chart were generated using Dragon dictation software. Although every effort was made to ensure the accuracy of this automated transcription, some errors in transcription may have occurred.

## 2021-09-08 ENCOUNTER — OFFICE VISIT (OUTPATIENT)
Dept: PULMONOLOGY | Facility: CLINIC | Age: 71
End: 2021-09-08

## 2021-09-08 VITALS
SYSTOLIC BLOOD PRESSURE: 138 MMHG | WEIGHT: 229.2 LBS | HEART RATE: 79 BPM | DIASTOLIC BLOOD PRESSURE: 80 MMHG | OXYGEN SATURATION: 99 % | HEIGHT: 70 IN | BODY MASS INDEX: 32.81 KG/M2

## 2021-09-08 DIAGNOSIS — J98.4 RESTRICTIVE LUNG DISEASE: ICD-10-CM

## 2021-09-08 DIAGNOSIS — E66.3 OVERWEIGHT: ICD-10-CM

## 2021-09-08 DIAGNOSIS — J98.4 SCARRING OF LUNG: ICD-10-CM

## 2021-09-08 DIAGNOSIS — J47.9 BRONCHIECTASIS WITHOUT COMPLICATION (HCC): Primary | ICD-10-CM

## 2021-09-08 PROCEDURE — 99214 OFFICE O/P EST MOD 30 MIN: CPT | Performed by: INTERNAL MEDICINE

## 2021-09-08 NOTE — ASSESSMENT & PLAN NOTE
Diet and exercise are encouraged and otherwise he will follow-up with his primary care physician regarding his elevated BMI.  His exercise capabilities may be somewhat limited at least in the short-term due to recent left knee replacement surgery.

## 2021-09-08 NOTE — PROGRESS NOTES
Chief Complaint  scarring of lung and restrictive lung disease    Subjective    History of Present Illness {CC  Problem List  Visit Diagnosis   Encounters  Notes  Medications  Labs  Result Review Imaging  Media     Eric Maloney presents to [facility] for lung scarring and productive cough.    History of Present Illness   The patient does have a history of some fibrotic changes noted on previous CAT scans.  On review of his CAT scan from 2017 in particular this would not be a definitive picture of IPF although it could be a possible early IPF picture.  I also think he clearly had some bronchiectatic changes.  My suspicion is that he is developing worsening bronchiectasis to cause his increased productive cough.  He denies any significant reflux symptoms.  I will obtain a follow-up high-resolution chest CT and also obtain pulmonary functions at [facility] and then see him back in a few weeks to go over the results of the above.  In the interim he may continue Mucinex or Mucinex DM which is helped him clear secretions I also provided him a prescription for a flutter valve device to help in clearing secretions.  Prior to Admission medications    Medication Sig Start Date End Date Taking? Authorizing Provider   acetaminophen (TYLENOL) 500 MG tablet Take 500 mg by mouth.   Yes Alin Quezada MD   aspirin 81 MG EC tablet Take 81 mg by mouth Daily.   Yes Alin Quezada MD   Cholecalciferol (VITAMIN D-3) 1000 units capsule Take  by mouth.   Yes Alin Quezada MD   Coenzyme Q10 (CO Q 10 PO) Take  by mouth.   Yes Alin Quezada MD   DiphenhydrAMINE HCl (BENADRYL PO) Take  by mouth.   Yes Alin Quezada MD   glipiZIDE (GLUCOTROL) 5 MG tablet Every 12 (Twelve) Hours.   Yes Alin Quezada MD   levothyroxine (SYNTHROID, LEVOTHROID) 75 MCG tablet Take 75 mcg by mouth Daily. 8/1/19  Yes Alin Quezada MD  "  METFORMIN HCL PO Take  by mouth.   Yes Alin Quezada MD   metoprolol tartrate (LOPRESSOR) 50 MG tablet 2 (Two) Times a Day.   Yes Alin Quezada MD   MILK THISTLE PO Take  by mouth.   Yes Alin Quezada MD   Multiple Vitamins-Minerals (MULTIVITAL PO) Take  by mouth.   Yes Alin Quezada MD   Omega-3 Fatty Acids (FISH OIL) 1000 MG capsule capsule Take  by mouth.   Yes Alin Quezada MD   pravastatin (PRAVACHOL) 40 MG tablet pravastatin 40 mg tablet   Take 1 tablet every day by oral route.   Yes Alin Quezada MD   Red Yeast Rice Extract (RED YEAST RICE PO) Take  by mouth.   Yes Alin Quezada MD   Saw Palmetto, Serenoa repens, (SAW PALMETTO PO) Take  by mouth.   Yes Alin Quezada MD       Social History     Socioeconomic History   • Marital status:      Spouse name: Not on file   • Number of children: Not on file   • Years of education: Not on file   • Highest education level: Not on file   Tobacco Use   • Smoking status: Former Smoker     Packs/day: 2.00     Years: 3.00     Pack years: 6.00     Types: Cigarettes     Quit date: 1970     Years since quittin.7   • Smokeless tobacco: Never Used   Substance and Sexual Activity   • Alcohol use: No   • Drug use: Defer   • Sexual activity: Defer       Objective   Vital Signs:   /80   Pulse 79   Ht 177.8 cm (70\")   Wt 104 kg (229 lb 3.2 oz)   SpO2 99% Comment: ra  BMI 32.89 kg/m²     Physical Exam  Vitals and nursing note reviewed.   Constitutional:       Appearance: He is obese.   HENT:      Head: Normocephalic.      Comments: He is wearing a mask.  Cardiovascular:      Rate and Rhythm: Normal rate and regular rhythm.   Pulmonary:      Effort: Pulmonary effort is normal.      Breath sounds: Normal breath sounds.   Musculoskeletal:      Comments: He has a scar on his right knee related to recent left knee replacement surgery.   Skin:     General: Skin is warm and dry.   Neurological:      " General: No focal deficit present.      Mental Status: He is alert and oriented to person, place, and time.   Psychiatric:         Mood and Affect: Mood normal.         Behavior: Behavior normal.        Result Review :{ Labs  Result Review  Imaging  Med Tab  Media :          Results for orders placed in visit on 08/22/19    Pulmonary Function Test                 My interpretation of imaging:    CT Chest Without Contrast (08/08/2019 00:00)  CT chest hi resolution (08/04/2017 10:38)        Assessment and Plan {CC Problem List  Visit Diagnosis  ROS  Review (Popup)  [facility] Maintenance  Quality  BestPractice  Medications  SmartSets  SnapShot Encounters  Media      Diagnoses and all orders for this visit:    1. Bronchiectasis without complication (CMS/HCC) (Primary)  Assessment & Plan:  He may continue his Mucinex or Mucinex DM and a prescription was provided for a flutter valve.  Also we will get a high-resolution chest CT at [facility].    Orders:  -     Cancel: OSCILLATING POSITIVE EXIRATORY PRESSURE (OPEP)  -     OSCILLATING POSITIVE EXIRATORY PRESSURE (OPEP)  -     CT Chest Hi Resolution; Future    2. Restrictive lung disease  Assessment & Plan:  We will again get follow-up pulmonary functions at [facility].    Orders:  -     Full Pulmonary Function Test With Bronchodilator; Future    3. Scarring of lung  Assessment & Plan:  I will get a follow-up chest CT at [facility] and will do this with high resolution technique.    Orders:  -     CT Chest Hi Resolution; Future    4. Overweight  Assessment & Plan:  Diet and exercise are encouraged and otherwise he will follow-up with his primary care physician regarding his elevated BMI.  His exercise capabilities may be somewhat limited at least in the short-term due to recent left knee replacement surgery.        Patient's Body mass index is 32.89 kg/m². indicating that he is obese (BMI >30). Obesity-related health conditions include the  following: dyslipidemias. Obesity is unchanged.  Diet and exercise are encouraged although his exercise capabilities in the short term may be somewhat limited in view of his recent left knee surgery.  Otherwise he will follow-up with his primary care physician regarding his elevated BMI.        Michael Stanford MD  9/8/2021  16:06 CDT    Follow Up {Instructions Charge Capture  Follow-up Communications   Return in about 3 weeks (around 9/29/2021) for To see me specifically.    Patient was given instructions and counseling regarding his condition or for health maintenance advice. Please see specific information pulled into the AVS if appropriate.

## 2021-09-08 NOTE — ASSESSMENT & PLAN NOTE
He may continue his Mucinex or Mucinex DM and a prescription was provided for a flutter valve.  Also we will get a high-resolution chest CT at [facility].

## 2021-09-08 NOTE — ASSESSMENT & PLAN NOTE
I will get a follow-up chest CT at [facility] and will do this with high resolution technique.

## 2021-09-08 NOTE — PATIENT INSTRUCTIONS
The patient is having issues with increased cough which is productive and the secretions are difficult to clear although Mucinex DM does seem to help him in clearing the secretions.  I suspect he may have some bronchiectasis associated with his pulmonary fibrosis.  We will plan on a high-resolution chest CT at [facility] along with pulmonary functions and see him back in a few weeks.  I did give him a prescription for a flutter valve device to use as well.

## 2021-09-20 ENCOUNTER — TRANSCRIBE ORDERS (OUTPATIENT)
Dept: LAB | Facility: HOSPITAL | Age: 71
End: 2021-09-20

## 2021-09-20 DIAGNOSIS — Z01.818 PREOP TESTING: Primary | ICD-10-CM

## 2021-09-27 ENCOUNTER — LAB (OUTPATIENT)
Dept: LAB | Facility: HOSPITAL | Age: 71
End: 2021-09-27

## 2021-09-27 LAB — SARS-COV-2 ORF1AB RESP QL NAA+PROBE: NOT DETECTED

## 2021-09-27 PROCEDURE — U0004 COV-19 TEST NON-CDC HGH THRU: HCPCS | Performed by: INTERNAL MEDICINE

## 2021-09-27 PROCEDURE — C9803 HOPD COVID-19 SPEC COLLECT: HCPCS | Performed by: INTERNAL MEDICINE

## 2021-09-27 PROCEDURE — U0005 INFEC AGEN DETEC AMPLI PROBE: HCPCS | Performed by: INTERNAL MEDICINE

## 2021-09-29 ENCOUNTER — OFFICE VISIT (OUTPATIENT)
Dept: PULMONOLOGY | Facility: CLINIC | Age: 71
End: 2021-09-29

## 2021-09-29 ENCOUNTER — HOSPITAL ENCOUNTER (OUTPATIENT)
Dept: CT IMAGING | Facility: HOSPITAL | Age: 71
Discharge: HOME | End: 2021-09-29

## 2021-09-29 ENCOUNTER — HOSPITAL ENCOUNTER (OUTPATIENT)
Dept: PULMONOLOGY | Facility: HOSPITAL | Age: 71
Discharge: HOME | End: 2021-09-29

## 2021-09-29 VITALS
WEIGHT: 233 LBS | DIASTOLIC BLOOD PRESSURE: 68 MMHG | HEIGHT: 70 IN | HEART RATE: 77 BPM | SYSTOLIC BLOOD PRESSURE: 130 MMHG | OXYGEN SATURATION: 96 % | BODY MASS INDEX: 33.36 KG/M2

## 2021-09-29 DIAGNOSIS — J47.9 BRONCHIECTASIS WITHOUT COMPLICATION (HCC): ICD-10-CM

## 2021-09-29 DIAGNOSIS — J98.4 RESTRICTIVE LUNG DISEASE: ICD-10-CM

## 2021-09-29 DIAGNOSIS — J98.4 SCARRING OF LUNG: ICD-10-CM

## 2021-09-29 DIAGNOSIS — E66.3 OVERWEIGHT: ICD-10-CM

## 2021-09-29 DIAGNOSIS — J47.9 BRONCHIECTASIS WITHOUT COMPLICATION (HCC): Primary | ICD-10-CM

## 2021-09-29 PROCEDURE — 94726 PLETHYSMOGRAPHY LUNG VOLUMES: CPT

## 2021-09-29 PROCEDURE — 94729 DIFFUSING CAPACITY: CPT | Performed by: INTERNAL MEDICINE

## 2021-09-29 PROCEDURE — 71250 CT THORAX DX C-: CPT

## 2021-09-29 PROCEDURE — 94060 EVALUATION OF WHEEZING: CPT

## 2021-09-29 PROCEDURE — 99214 OFFICE O/P EST MOD 30 MIN: CPT | Performed by: INTERNAL MEDICINE

## 2021-09-29 PROCEDURE — 94664 DEMO&/EVAL PT USE INHALER: CPT | Performed by: INTERNAL MEDICINE

## 2021-09-29 PROCEDURE — 94729 DIFFUSING CAPACITY: CPT

## 2021-09-29 PROCEDURE — 94726 PLETHYSMOGRAPHY LUNG VOLUMES: CPT | Performed by: INTERNAL MEDICINE

## 2021-09-29 PROCEDURE — 94060 EVALUATION OF WHEEZING: CPT | Performed by: INTERNAL MEDICINE

## 2021-09-29 RX ORDER — ALBUTEROL SULFATE 2.5 MG/3ML
2.5 SOLUTION RESPIRATORY (INHALATION) ONCE
Status: COMPLETED | OUTPATIENT
Start: 2021-09-29 | End: 2021-09-29

## 2021-09-29 RX ORDER — TIOTROPIUM BROMIDE INHALATION SPRAY 3.12 UG/1
2 SPRAY, METERED RESPIRATORY (INHALATION)
Qty: 2 EACH | Refills: 0 | COMMUNITY
Start: 2021-09-29 | End: 2021-12-02 | Stop reason: SDUPTHER

## 2021-09-29 RX ADMIN — ALBUTEROL SULFATE 2.5 MG: 2.5 SOLUTION RESPIRATORY (INHALATION) at 07:25

## 2021-09-29 NOTE — ASSESSMENT & PLAN NOTE
This is associated with his lung scarring and is noted on his pulmonary functions done earlier today again I am going to get a follow-up flow-volume loop on his return visit in about 2 months to see if there is any improvement with routine use of Spiriva therapy.

## 2021-09-29 NOTE — PROGRESS NOTES
Chief Complaint  Cough    Subjective    History of Present Illness {CC  Problem List  Visit Diagnosis   Encounters  Notes  Medications  Labs  Result Review Imaging  Media     Eric Maloney presents to [facility] for lung scarring and chronic productive cough.    History of Present Illness   The patient states his major issue is a chronic productive cough.  He is not having difficulty in clearing secretions but just has this chronic issue with a productive cough.  His recent chest CT is reviewed and actually reviewed with the patient as well today.  He does have some pleural thickening and some interstitial fibrotic changes but not a definitive IPF picture by any means as there is no honeycombing.  The report said no bronchiectasis but to my review there is clearly some bronchiectatic changes noted.  These appear again very similar to a CT from 2017.  Pulmonary functions done earlier today do show a mild restrictive defect with minimal worsening of his total lung capacity and his baseline prebronchodilator spirometry compared to his previous studies done here in the office on August 22 of 2019.  I did tell him we will try him on the Spiriva Respimat to see if this helps his issues with a chronic productive cough.  Also I will plan on a follow-up visit in about 2 months with a flow-volume loop.  Prior to Admission medications    Medication Sig Start Date End Date Taking? Authorizing Provider   acetaminophen (TYLENOL) 500 MG tablet Take 500 mg by mouth.   Yes Alin Quezada MD   aspirin 81 MG EC tablet Take 81 mg by mouth Daily.   Yes Alin Quezada MD   Cholecalciferol (VITAMIN D-3) 1000 units capsule Take  by mouth.   Yes Alin Quezada MD   Coenzyme Q10 (CO Q 10 PO) Take  by mouth.   Yes Alin Quezada MD   DiphenhydrAMINE HCl (BENADRYL PO) Take  by mouth.   Yes Alin Quezada MD   glipiZIDE (GLUCOTROL) 5 MG tablet  "Every 12 (Twelve) Hours.   Yes Alin Quezada MD   levothyroxine (SYNTHROID, LEVOTHROID) 75 MCG tablet Take 75 mcg by mouth Daily. 19  Yes Alin Quezada MD   METFORMIN HCL PO Take  by mouth.   Yes Alin Quezada MD   metoprolol tartrate (LOPRESSOR) 50 MG tablet 2 (Two) Times a Day.   Yes Alin Quezada MD   MILK THISTLE PO Take  by mouth.   Yes Alin Quezada MD   Multiple Vitamins-Minerals (MULTIVITAL PO) Take  by mouth.   Yes Alin Quezada MD   Omega-3 Fatty Acids (FISH OIL) 1000 MG capsule capsule Take  by mouth.   Yes Alin Quezada MD   pravastatin (PRAVACHOL) 40 MG tablet pravastatin 40 mg tablet   Take 1 tablet every day by oral route.   Yes Alin Quezada MD   Red Yeast Rice Extract (RED YEAST RICE PO) Take  by mouth.   Yes Alin Quezada MD   Saw Palmetto, Serenoa repens, (SAW PALMETTO PO) Take  by mouth.   Yes Alin Quezada MD       Social History     Socioeconomic History   • Marital status:      Spouse name: Not on file   • Number of children: Not on file   • Years of education: Not on file   • Highest education level: Not on file   Tobacco Use   • Smoking status: Former Smoker     Packs/day: 2.00     Years: 3.00     Pack years: 6.00     Types: Cigarettes     Quit date: 1970     Years since quittin.7   • Smokeless tobacco: Never Used   Substance and Sexual Activity   • Alcohol use: No   • Drug use: Defer   • Sexual activity: Defer       Objective   Vital Signs:   /68   Pulse 77   Ht 177.8 cm (70\")   Wt 106 kg (233 lb)   SpO2 96% Comment: ra  BMI 33.43 kg/m²     Physical Exam  Vitals and nursing note reviewed.   Constitutional:       Appearance: He is obese.   HENT:      Head: Normocephalic.      Comments: He is wearing a mask.  Eyes:      Extraocular Movements: Extraocular movements intact.      Pupils: Pupils are equal, round, and reactive to light.   Cardiovascular:      Rate and Rhythm: Normal rate and " regular rhythm.   Pulmonary:      Effort: Pulmonary effort is normal.      Comments: He has clear lung fields bilaterally with no adventitious sounds heard.  Musculoskeletal:         General: Normal range of motion.   Skin:     General: Skin is warm and dry.   Neurological:      General: No focal deficit present.      Mental Status: He is alert and oriented to person, place, and time.   Psychiatric:         Mood and Affect: Mood normal.         Behavior: Behavior normal.        Result Review :{ Labs  Result Review  Imaging  Med Tab  Media :          Results for orders placed during the hospital encounter of 09/29/21    Full Pulmonary Function Test With Bronchodilator    Narrative  [facility] - Pulmonary Function Test    [address]  [place]  [place]  [address]  [phone]    Patient : Erci Maloney  MRN : [id number]  CSN : [id number]  Pulmonologist : Michael Stanford MD  Date : 9/29/2021    ______________________________________________________________________    Interpretation :  1.  Spirometry is consistent with a mild restrictive ventilatory defect with coexisting small airways disease.  2.  There is slight improvement in spirometry postbronchodilator.  A mild restrictive ventilatory defect still appears to be present but small airways function is now normal.  3.  Lung volumes reveal a mild restrictive ventilatory defect along with a decrease in inspiratory capacity.  4.  There is a mild diffusion impairment which when corrected for alveolar volume is normalized.  5.  When current studies are compared to [facility] of [facility], there has been a slight drop in the patient's current prebronchodilator FVC and FEV1 compared to previous baseline spirometry.  The patient's current postbronchodilator spirometry is essentially unchanged from previous baseline spirometry.  There has been a slight drop in total lung capacity compared to previous studies.   When corrected for alveolar volume, there has been a drop in diffusion capacity compared to previous although it remains within normal limits.      Michael Stanford MD      Results for orders placed in visit on 08/22/19    Pulmonary Function Test                 My interpretation of imaging:    CT Chest Hi Resolution Diagnostic (09/29/2021 11:03)        Assessment and Plan {CC Problem List  Visit Diagnosis  ROS  Review (Popup)  [facility] Maintenance  Quality  BestPractice  Medications  SmartSets  SnapShot Encounters  Media      Diagnoses and all orders for this visit:    1. Bronchiectasis without complication (CMS/HCC) (Primary)  Assessment & Plan:  Again he clearly still has some bronchiectatic changes noted on CT regardless of the official report.  We will try him on the Spiriva Respimat and I demonstrated the use of the Respimat device to him.  I will check an alpha-1 antitrypsin study as well.    Orders:  -     tiotropium bromide monohydrate (Spiriva Respimat) 2.5 MCG/ACT aerosol solution inhaler; Inhale 2 puffs Daily.  Dispense: 2 each; Refill: 0  -     Cancel: Alpha - 1 - Antitrypsin; Future  -     Alpha - 1 - Antitrypsin; Future    2. Scarring of lung  Assessment & Plan:  This may relate to previous occupational exposure but also could relate to previous pneumonia when he was younger.        3. Restrictive lung disease  Assessment & Plan:  This is associated with his lung scarring and has been noted on prior pulmonary functions.  Again I am going to get a flow-volume loop on his return visit in about 2 months.    Orders:  -     Spirometry Without Bronchodilator; Future    4. Overweight  Assessment & Plan:  Diet and exercise are encouraged and he will follow-up with his primary care physician regarding his elevated BMI otherwise.        Patient's Body mass index is 33.43 kg/m². indicating that he is obese (BMI >30). Obesity-related health conditions include the following: dyslipidemias. Obesity  is unchanged.  Diet and exercise are encouraged and he will follow-up with his primary care physician regarding his elevated BMI otherwise.        Michael Stanford MD  9/29/2021  17:30 CDT    Follow Up {Instructions Charge Capture  Follow-up Communications   Return in about 2 months (around 11/29/2021) for FVL, To see me specifically.    Patient was given instructions and counseling regarding his condition or for health maintenance advice. Please see specific information pulled into the AVS if appropriate.

## 2021-09-29 NOTE — ASSESSMENT & PLAN NOTE
Diet and exercise are encouraged and he will follow-up with his primary care physician regarding his elevated BMI otherwise.

## 2021-09-29 NOTE — ASSESSMENT & PLAN NOTE
This may relate to previous occupational exposure but also could relate to previous pneumonia when he was younger.

## 2021-09-29 NOTE — ASSESSMENT & PLAN NOTE
Again he clearly still has some bronchiectatic changes noted on CT regardless of the official report.  We will try him on the Spiriva Respimat and I demonstrated the use of the Respimat device to him.  I will check an alpha-1 antitrypsin study as well.

## 2021-09-29 NOTE — PATIENT INSTRUCTIONS
The patient's CT showed stability of his fibrotic changes and he has some coexisting bronchiectatic changes as well.  There clearly was no progression dating back to 2017.  His major issue is a persistent productive cough.  I want to try him on the Spiriva Respimat to see if this helps some of these issues.  Otherwise we will have him come back in about 2 months with a flow-volume loop on return.  I am going to check an alpha-1 swab today in view of the presence of bronchiectasis although this likely relates to either previous occupational exposure, previous pneumonia when he was younger, or combination of both.  I did demonstrate the use of the Respimat device to him.

## 2021-09-30 ENCOUNTER — TELEPHONE (OUTPATIENT)
Dept: PULMONOLOGY | Facility: CLINIC | Age: 71
End: 2021-09-30

## 2021-09-30 NOTE — TELEPHONE ENCOUNTER
I did contact the patient regarding his pulmonary functions results that were done at [facility] yesterday.  He did have some response to bronchodilator therapy of a mild degree and hopefully the routine use of Spiriva will result in improvement in his baseline pulmonary functions and will follow up on that with a flow-volume loop and he returns in about 2 months again after being on the Spiriva Respimat for that period of time.

## 2021-11-24 ENCOUNTER — TELEPHONE (OUTPATIENT)
Dept: PULMONOLOGY | Facility: CLINIC | Age: 71
End: 2021-11-24

## 2021-11-24 NOTE — TELEPHONE ENCOUNTER
[employer] with the alpha 1 test called stating that the test that was done for the patient that the barcodes on the paper and the tubes do not match.      I tried to call her regarding this and she was supposed to return my call but she didn't so I will check on it next week.

## 2021-11-29 ENCOUNTER — LAB (OUTPATIENT)
Dept: LAB | Facility: HOSPITAL | Age: 71
End: 2021-11-29

## 2021-11-29 DIAGNOSIS — Z20.822 ENCOUNTER FOR PREOPERATIVE SCREENING LABORATORY TESTING FOR COVID-19 VIRUS: ICD-10-CM

## 2021-11-29 DIAGNOSIS — Z01.812 ENCOUNTER FOR PREOPERATIVE SCREENING LABORATORY TESTING FOR COVID-19 VIRUS: ICD-10-CM

## 2021-11-29 LAB — SARS-COV-2 ORF1AB RESP QL NAA+PROBE: NOT DETECTED

## 2021-11-29 PROCEDURE — U0004 COV-19 TEST NON-CDC HGH THRU: HCPCS

## 2021-11-29 PROCEDURE — U0005 INFEC AGEN DETEC AMPLI PROBE: HCPCS

## 2021-11-29 PROCEDURE — C9803 HOPD COVID-19 SPEC COLLECT: HCPCS

## 2021-12-02 ENCOUNTER — OFFICE VISIT (OUTPATIENT)
Dept: PULMONOLOGY | Facility: CLINIC | Age: 71
End: 2021-12-02

## 2021-12-02 VITALS
BODY MASS INDEX: 33.58 KG/M2 | HEART RATE: 62 BPM | SYSTOLIC BLOOD PRESSURE: 122 MMHG | WEIGHT: 234.6 LBS | OXYGEN SATURATION: 98 % | HEIGHT: 70 IN | DIASTOLIC BLOOD PRESSURE: 64 MMHG

## 2021-12-02 DIAGNOSIS — Z20.822 ENCOUNTER FOR PREOPERATIVE SCREENING LABORATORY TESTING FOR COVID-19 VIRUS: ICD-10-CM

## 2021-12-02 DIAGNOSIS — J98.4 RESTRICTIVE LUNG DISEASE: ICD-10-CM

## 2021-12-02 DIAGNOSIS — Z01.812 ENCOUNTER FOR PREOPERATIVE SCREENING LABORATORY TESTING FOR COVID-19 VIRUS: ICD-10-CM

## 2021-12-02 DIAGNOSIS — J98.4 SCARRING OF LUNG: ICD-10-CM

## 2021-12-02 DIAGNOSIS — J47.9 BRONCHIECTASIS WITHOUT COMPLICATION (HCC): Primary | ICD-10-CM

## 2021-12-02 DIAGNOSIS — E66.9 OBESITY (BMI 30-39.9): ICD-10-CM

## 2021-12-02 PROBLEM — Z11.52 ENCOUNTER FOR PREOPERATIVE SCREENING LABORATORY TESTING FOR COVID-19 VIRUS: Status: ACTIVE | Noted: 2021-12-02

## 2021-12-02 PROCEDURE — 94010 BREATHING CAPACITY TEST: CPT | Performed by: INTERNAL MEDICINE

## 2021-12-02 PROCEDURE — 99214 OFFICE O/P EST MOD 30 MIN: CPT | Performed by: INTERNAL MEDICINE

## 2021-12-02 RX ORDER — TIOTROPIUM BROMIDE INHALATION SPRAY 3.12 UG/1
2 SPRAY, METERED RESPIRATORY (INHALATION)
Qty: 2 EACH | Refills: 0 | COMMUNITY
Start: 2021-12-02

## 2021-12-02 NOTE — PROCEDURES
Spirometry Without Bronchodilator  Performed by: Debi Diana, RRT  Authorized by: Michael Stanford MD      Pre Drug % Predicted    FVC: 77%   FEV1: 76%   FEF 25-75%: 66%   FEV1/FVC: 76.86%    Interpretation   Spirometry   Spirometry shows mild restriction. There is reduced midflow suggesting small airway/airflow obstruction.   Overall comments: Spirometry is consistent with a mild restrictive ventilatory defect with a coexisting decrease in midflows consistent with small airways disease.  When current studies are compared to studies done at  on September 29 of this year, there has been very slight improvement in his FVC and FEV1 compared to previous prebronchodilator studies.

## 2021-12-02 NOTE — ASSESSMENT & PLAN NOTE
His pulmonary function show slight improvement in terms of spirometry compared to previous prebronchodilator studies.  He may continue his Spiriva Respimat for now and samples were provided.  I will repeat a flow-volume loop on his return visit next October.  Again we will also have him come in sometime in the next few weeks for an alpha-1 antitrypsin swab.

## 2021-12-02 NOTE — PATIENT INSTRUCTIONS
The patient thinks Spiriva may have helped him somewhat.  I told him its not crucial to use this but he can continue it if it does seem to help him.  Additional samples were provided.  His PFTs today show slight improvement compared to his previous baseline studies.  I will plan on a follow-up visit next October with a repeat CT scan and a flow-volume loop.

## 2021-12-02 NOTE — PROGRESS NOTES
Chief Complaint  Bronchiectasis without complication.    Subjective    History of Present Illness {CC  Problem List  Visit Diagnosis   Encounters  Notes  Medications  Labs  Result Review Imaging  Media     Eric Maloney presents to [facility] for lung scarring and bronchiectasis.    History of Present Illness   The patient thinks Spiriva did help him somewhat.  His pulmonary functions today do show slight improvement compared to previous prebronchodilator studies.  After he left the office I did discover that his previous alpha-1 study was not run due to some technical difficulties and I did call him and he will come by sometime in the next few weeks on a Friday afternoon to have a repeat swab performed.  Again he thinks he is doing a bit better since being on the Spiriva and I provided him additional samples and told him that he can continue these as well as a seem to benefit him but it is not crucial that he use them on a regular basis if he stable.  He has had his COVID-19 vaccines including a booster in the form of the Pfizer vaccine and is also had his flu shot.  Prior to Admission medications    Medication Sig Start Date End Date Taking? Authorizing Provider   acetaminophen (TYLENOL) 500 MG tablet Take 500 mg by mouth.   Yes Alin Quezada MD   aspirin 81 MG EC tablet Take 81 mg by mouth Daily.   Yes Alin Quezada MD   Cholecalciferol (VITAMIN D-3) 1000 units capsule Take  by mouth.   Yes Alin Quezada MD   Coenzyme Q10 (CO Q 10 PO) Take  by mouth.   Yes Alin Quezada MD   DiphenhydrAMINE HCl (BENADRYL PO) Take  by mouth.   Yes Alin Quezada MD   glipiZIDE (GLUCOTROL) 5 MG tablet Every 12 (Twelve) Hours.   Yes Alin Quezada MD   levothyroxine (SYNTHROID, LEVOTHROID) 75 MCG tablet Take 75 mcg by mouth Daily. 8/1/19  Yes Alin Quezada MD   METFORMIN HCL PO Take  by mouth.   Yes Pilar  "MD Alin   metoprolol tartrate (LOPRESSOR) 50 MG tablet 2 (Two) Times a Day.   Yes ProviderAlin MD   MILK THISTLE PO Take  by mouth.   Yes Alin Quezada MD   Multiple Vitamins-Minerals (MULTIVITAL PO) Take  by mouth.   Yes Alin Quezada MD   Omega-3 Fatty Acids (FISH OIL) 1000 MG capsule capsule Take  by mouth.   Yes Alin Quezada MD   pravastatin (PRAVACHOL) 40 MG tablet pravastatin 40 mg tablet   Take 1 tablet every day by oral route.   Yes Alin Quezada MD   Red Yeast Rice Extract (RED YEAST RICE PO) Take  by mouth.   Yes ProviderAlin MD   Saw Palmetto, Serenoa repens, (SAW PALMETTO PO) Take  by mouth.   Yes Alin Quezada MD   tiotropium bromide monohydrate (Spiriva Respimat) 2.5 MCG/ACT aerosol solution inhaler Inhale 2 puffs Daily. 21  Yes Michael Stanford MD   tiotropium bromide monohydrate (Spiriva Respimat) 2.5 MCG/ACT aerosol solution inhaler Inhale 2 puffs Daily. 21 Yes Michael Stanford MD       Social History     Socioeconomic History   • Marital status:    Tobacco Use   • Smoking status: Former Smoker     Packs/day: 2.00     Years: 3.00     Pack years: 6.00     Types: Cigarettes     Quit date: 1970     Years since quittin.9   • Smokeless tobacco: Never Used   Substance and Sexual Activity   • Alcohol use: No   • Drug use: Defer   • Sexual activity: Defer       Objective   Vital Signs:   /64   Pulse 62   Ht 177.8 cm (70\")   Wt 106 kg (234 lb 9.6 oz)   SpO2 98% Comment: ra  BMI 33.66 kg/m²     Physical Exam  Vitals and nursing note reviewed.   Constitutional:       Appearance: He is obese.   HENT:      Head: Normocephalic.      Comments: He is wearing a mask.  Eyes:      Extraocular Movements: Extraocular movements intact.      Pupils: Pupils are equal, round, and reactive to light.   Cardiovascular:      Rate and Rhythm: Normal rate and regular rhythm.   Pulmonary:      Effort: Pulmonary " effort is normal.      Breath sounds: Normal breath sounds.   Musculoskeletal:         General: Normal range of motion.   Skin:     General: Skin is warm and dry.   Neurological:      General: No focal deficit present.      Mental Status: He is alert and oriented to person, place, and time.   Psychiatric:         Mood and Affect: Mood normal.         Behavior: Behavior normal.        Result Review :{ Labs  Result Review  Imaging  Med Tab  Media :    PFT Values        Some values may be hidden. Unless noted otherwise, only the newest values recorded on each date are displayed.         Old Values PFT Results 12/2/21   No data to display.      Pre Drug PFT Results 12/2/21   FVC 77   FEV1 76   FEF 25-75% 66   FEV1/FVC 76.86      Post Drug PFT Results 12/2/21   No data to display.      Other Tests PFT Results 12/2/21   No data to display.               Results for orders placed in visit on 12/02/21    Spirometry Without Bronchodilator    Narrative  Spirometry Without Bronchodilator  Performed by: Debi Diana, RRT  Authorized by: Michael Stanford MD    Pre Drug % Predicted  FVC: 77%  FEV1: 76%  FEF 25-75%: 66%  FEV1/FVC: 76.86%    Interpretation  Spirometry  Spirometry shows mild restriction. There is reduced midflow suggesting small airway/airflow obstruction.  Overall comments: Spirometry is consistent with a mild restrictive ventilatory defect with a coexisting decrease in midflows consistent with small airways disease.  When current studies are compared to studies done at [facility] on September 29 of this year, there has been very slight improvement in his FVC and FEV1 compared to previous prebronchodilator studies.      Results for orders placed during the hospital encounter of 09/29/21    Full Pulmonary Function Test With Bronchodilator    Narrative  [facility] - Pulmonary Function Test    [address] [place] [address]  [place]  [address]  [phone]    Patient : Eric LARSEN  Haider  MRN : [id number]  CSN : [id number]  Pulmonologist : Michael Stanford MD  Date : 9/29/2021    ______________________________________________________________________    Interpretation :  1.  Spirometry is consistent with a mild restrictive ventilatory defect with coexisting small airways disease.  2.  There is slight improvement in spirometry postbronchodilator.  A mild restrictive ventilatory defect still appears to be present but small airways function is now normal.  3.  Lung volumes reveal a mild restrictive ventilatory defect along with a decrease in inspiratory capacity.  4.  There is a mild diffusion impairment which when corrected for alveolar volume is normalized.  5.  When current studies are compared to [facility] of [facility], there has been a slight drop in the patient's current prebronchodilator FVC and FEV1 compared to previous baseline spirometry.  The patient's current postbronchodilator spirometry is essentially unchanged from previous baseline spirometry.  There has been a slight drop in total lung capacity compared to previous studies.  When corrected for alveolar volume, there has been a drop in diffusion capacity compared to previous although it remains within normal limits.      Michael Stanford MD      Results for orders placed in visit on 08/22/19    Pulmonary Function Test                 My interpretation of imaging:   CT Chest Hi Resolution Diagnostic (09/29/2021 11:03)    My interpretation of labs:   COVID PRE-OP / PRE-PROCEDURE SCREENING ORDER (NO ISOLATION) - Swab, Nasal Cavity (11/29/2021 10:11)      Assessment and Plan {CC Problem List  Visit Diagnosis  ROS  Review (Popup)  Health Maintenance  Quality  BestPractice  Medications  SmartSets  SnapShot Encounters  Media      Diagnoses and all orders for this visit:    1. Bronchiectasis without complication (HCC) (Primary)  Assessment & Plan:  His pulmonary function show  slight improvement in terms of spirometry compared to previous prebronchodilator studies.  He may continue his Spiriva Respimat for now and samples were provided.  I will repeat a flow-volume loop on his return visit next October.  Again we will also have him come in sometime in the next few weeks for an alpha-1 antitrypsin swab.    Orders:  -     tiotropium bromide monohydrate (Spiriva Respimat) 2.5 MCG/ACT aerosol solution inhaler; Inhale 2 puffs Daily.  Dispense: 2 each; Refill: 0  -     CT Chest Hi Resolution; Future    2. Restrictive lung disease  Assessment & Plan:  This is associated with his lung scarring.    Orders:  -     Spirometry Without Bronchodilator  -     Spirometry Without Bronchodilator; Future  -     CT Chest Hi Resolution; Future    3. Scarring of lung  Assessment & Plan:  His last chest CT was stable.  I will repeat his CT on his return visit in October of next year.      4. Obesity (BMI 30-39.9)  Assessment & Plan:  Diet and exercise are encouraged and he will follow-up with his primary care physician regarding his elevated BMI otherwise.      5. Encounter for preoperative screening laboratory testing for COVID-19 virus  Assessment & Plan:  His Covid test was negative.    Orders:  -     COVID PRE-OP / PRE-PROCEDURE SCREENING ORDER (NO ISOLATION) - Swab, Nasal Cavity; Future      Patient's Body mass index is 33.66 kg/m². indicating that he is obese (BMI >30). Obesity-related health conditions include the following: dyslipidemias. Obesity is unchanged.  Diet and exercise are encouraged to follow-up with his primary care physician regarding his elevated BMI otherwise.        Michael Stanford MD  12/2/2021  13:50 CST    Follow Up {Instructions Charge Capture  Follow-up Communications   Return in about 10 months (around 10/11/2022) for FVL, To see me specifically.    Patient was given instructions and counseling regarding his condition or for health maintenance advice. Please see specific  information pulled into the AVS if appropriate.

## 2021-12-03 ENCOUNTER — CLINICAL SUPPORT (OUTPATIENT)
Dept: PULMONOLOGY | Facility: CLINIC | Age: 71
End: 2021-12-03

## 2022-10-10 DIAGNOSIS — I65.23 BILATERAL CAROTID ARTERY STENOSIS: Primary | ICD-10-CM

## 2022-10-20 ENCOUNTER — OFFICE VISIT (OUTPATIENT)
Dept: PULMONOLOGY | Facility: CLINIC | Age: 72
End: 2022-10-20

## 2022-10-20 VITALS
OXYGEN SATURATION: 98 % | HEIGHT: 70 IN | WEIGHT: 232.2 LBS | HEART RATE: 66 BPM | SYSTOLIC BLOOD PRESSURE: 142 MMHG | DIASTOLIC BLOOD PRESSURE: 86 MMHG | BODY MASS INDEX: 33.24 KG/M2

## 2022-10-20 DIAGNOSIS — E66.9 OBESITY (BMI 30-39.9): ICD-10-CM

## 2022-10-20 DIAGNOSIS — J98.4 SCARRING OF LUNG: ICD-10-CM

## 2022-10-20 DIAGNOSIS — J98.4 RESTRICTIVE LUNG DISEASE: Primary | ICD-10-CM

## 2022-10-20 DIAGNOSIS — J47.9 BRONCHIECTASIS WITHOUT COMPLICATION: ICD-10-CM

## 2022-10-20 PROCEDURE — 99214 OFFICE O/P EST MOD 30 MIN: CPT | Performed by: INTERNAL MEDICINE

## 2022-10-20 PROCEDURE — 94010 BREATHING CAPACITY TEST: CPT | Performed by: INTERNAL MEDICINE

## 2022-10-20 NOTE — PROGRESS NOTES
Chief Complaint  Bronchiectasis without complication and Restrictive lung disease    Subjective    History of Present Illness     Eric Maloney presents to [facility] for bronchiectasis and lung scarring.    History of Present Illness   The patient is doing well clinically.  He was scheduled for a chest CT but actually did not have this performed but did have an abdominal CT and a chest x-ray performed earlier this week at Dr. Patel's particularly on the 18th.  I did review these on Dr. Patel's system.  His x-ray showed interstitial changes which were felt to be more pronounced than on a chest x-ray from 2017.  His abdominal CT did include several lower chest cuts and he does have evidence of some fibrotic change but these appeared chronic with no evidence of honeycombing and no definite progression when the lower chest cuts are compared to previous chest CT from Dr. Patel's from 2019.  Also I did compare his recent scan to his chest CT that was performed at [facility] last September and I see no significant change when the corresponding areas of the lung fields are compared.  His pulmonary functions today show no significant change from previous.  In the absence of any significant change I told him I would just recommend yearly pulmonary functions we can hold off on a complete chest CT for now.  I will plan on follow-up pulmonary functions in 1 year then we can discuss the CT with him at that time depending on how he is doing symptomatically and also based on his PFTs.  He does have issues with some sinus congestion and chest congestion and is on a Phenergan DM cough syrup preparation.  He states Sudafed has done well for him in the past for nasal congestion and I told him he could try over-the-counter Mucinex D.  He should not require a prescription but likely will have to sign for the medication because of the Sudafed component.  If he does require a  prescription I told him to contact the office and I will be glad to E prescribe this.  He has had the COVID-19 vaccine in the form of the Pfizer vaccine including 2 boosters.  He has had the flu shot this flu season and has had both Prevnar 13 and a Pneumovax previously.  Prior to Admission medications    Medication Sig Start Date End Date Taking? Authorizing Provider   acetaminophen (TYLENOL) 500 MG tablet Take 500 mg by mouth.   Yes Alin Quezada MD   aspirin 81 MG EC tablet Take 81 mg by mouth Daily.   Yes Alin Quezada MD   Cholecalciferol (VITAMIN D-3) 1000 units capsule Take  by mouth.   Yes Alin Quezada MD   Coenzyme Q10 (CO Q 10 PO) Take  by mouth.   Yes Alin Quezada MD   DiphenhydrAMINE HCl (BENADRYL PO) Take  by mouth.   Yes Alin Quezada MD   glipiZIDE (GLUCOTROL) 5 MG tablet Every 12 (Twelve) Hours.   Yes Alin Quezada MD   levothyroxine (SYNTHROID, LEVOTHROID) 75 MCG tablet Take 75 mcg by mouth Daily. 8/1/19  Yes Alin Quezada MD   METFORMIN HCL PO Take  by mouth.   Yes Alin Quezada MD   metoprolol tartrate (LOPRESSOR) 50 MG tablet 2 (Two) Times a Day.   Yes Alin Quezada MD   MILK THISTLE PO Take  by mouth.   Yes Alin Quezada MD   Multiple Vitamins-Minerals (MULTIVITAL PO) Take  by mouth.   Yes Alin Quezada MD   Omega-3 Fatty Acids (FISH OIL) 1000 MG capsule capsule Take  by mouth.   Yes Alin Quezada MD   pravastatin (PRAVACHOL) 40 MG tablet pravastatin 40 mg tablet   Take 1 tablet every day by oral route.   Yes Provider, Historical, MD   Saw Palmetto, Serenoa repens, (SAW PALMETTO PO) Take  by mouth.   Yes Alin Quezada MD   tiotropium bromide monohydrate (Spiriva Respimat) 2.5 MCG/ACT aerosol solution inhaler Inhale 2 puffs Daily. 12/2/21  Yes Michael Stanford MD   Red Yeast Rice Extract (RED YEAST RICE PO) Take  by mouth.    Alin Quezada MD       Social History  "    Socioeconomic History   • Marital status:    Tobacco Use   • Smoking status: Former     Packs/day: 2.00     Years: 3.00     Pack years: 6.00     Types: Cigarettes     Quit date: 1970     Years since quittin.8   • Smokeless tobacco: Never   Substance and Sexual Activity   • Alcohol use: No   • Drug use: Defer   • Sexual activity: Defer       Objective   Vital Signs:   /86   Pulse 66   Ht 177.8 cm (70\")   Wt 105 kg (232 lb 3.2 oz)   SpO2 98% Comment: RA  BMI 33.32 kg/m²     Physical Exam  Vitals and nursing note reviewed.   Constitutional:       Appearance: He is obese.   HENT:      Head: Normocephalic.      Comments: He is wearing a mask.  Eyes:      Extraocular Movements: Extraocular movements intact.      Pupils: Pupils are equal, round, and reactive to light.   Cardiovascular:      Rate and Rhythm: Normal rate and regular rhythm.   Pulmonary:      Effort: Pulmonary effort is normal.      Breath sounds: Normal breath sounds.   Musculoskeletal:         General: Normal range of motion.   Skin:     General: Skin is warm.   Neurological:      General: No focal deficit present.      Mental Status: He is alert and oriented to person, place, and time.   Psychiatric:         Mood and Affect: Mood normal.         Behavior: Behavior normal.        Result Review :    PFT Values        Some values may be hidden. Unless noted otherwise, only the newest values recorded on each date are displayed.         Old Values PFT Results 12/2/21 10/20/22   No data to display.      Pre Drug PFT Results 12/2/21 10/20/22   FVC 77 70   FEV1 76 77   FEF 25-75% 66 106   FEV1/FVC 76.86 82      Post Drug PFT Results 12/2/21 10/20/22   No data to display.      Other Tests PFT Results 12/2/21 10/20/22   No data to display.               Results for orders placed in visit on 10/20/22    Spirometry Without Bronchodilator    Narrative  Spirometry Without Bronchodilator  Performed by: Debi Diana, RRT  Authorized by: " Michael Stanford MD    Pre Drug % Predicted  FVC: 70%  FEV1: 77%  FEF 25-75%: 106%  FEV1/FVC: 82%    Interpretation  Spirometry  Spirometry shows mild restriction. midflow is normal.  Overall comments: The patient's spirometry is consistent with a mild restrictive ventilatory defect.  When current studies are compared to studies performed on December 2, 2021, there has been a slight but not significant drop in the patient's FVC and FEV1 compared to previous.  Midflows have actually improved compared to previous and are now within normal limits.      Results for orders placed in visit on 12/02/21    Spirometry Without Bronchodilator    Narrative  Spirometry Without Bronchodilator  Performed by: Debi Diana, RRT  Authorized by: Michael Stanford MD    Pre Drug % Predicted  FVC: 77%  FEV1: 76%  FEF 25-75%: 66%  FEV1/FVC: 76.86%    Interpretation  Spirometry  Spirometry shows mild restriction. There is reduced midflow suggesting small airway/airflow obstruction.  Overall comments: Spirometry is consistent with a mild restrictive ventilatory defect with a coexisting decrease in midflows consistent with small airways disease.  When current studies are compared to studies done at [facility] on September 29 of this year, there has been very slight improvement in his FVC and FEV1 compared to previous prebronchodilator studies.      Results for orders placed during the hospital encounter of 09/29/21    Full Pulmonary Function Test With Bronchodilator    Narrative  [facility] - Pulmonary Function Test    [address] [place] [address]  [place]  [address]  [phone]    Patient : Eric Maloney  MRN : [id number]  CSN : [id number]  Pulmonologist : Michael Stanford MD  Date : 9/29/2021    ______________________________________________________________________    Interpretation :  1.  Spirometry is consistent with a mild restrictive ventilatory defect with coexisting small airways disease.  2.   There is slight improvement in spirometry postbronchodilator.  A mild restrictive ventilatory defect still appears to be present but small airways function is now normal.  3.  Lung volumes reveal a mild restrictive ventilatory defect along with a decrease in inspiratory capacity.  4.  There is a mild diffusion impairment which when corrected for alveolar volume is normalized.  5.  When current studies are compared to [facility] August 22 of 2019, there has been a slight drop in the patient's current prebronchodilator FVC and FEV1 compared to previous baseline spirometry.  The patient's current postbronchodilator spirometry is essentially unchanged from previous baseline spirometry.  There has been a slight drop in total lung capacity compared to previous studies.  When corrected for alveolar volume, there has been a drop in diffusion capacity compared to previous although it remains within normal limits.      Michael Stanford MD                 My interpretation of imaging:    CT Chest Hi Resolution Diagnostic (09/29/2021 11:03)  I did review his recent imaging studies from Dr. Patel's particular his recent chest x-ray and abdominal CT and also I reviewed his previous chest CT and chest x-ray on Dr. Patel's system as well.  My interpretation of labs:   Alpha - 1 - Antitrypsin (12/03/2021 00:00)      Assessment and Plan     Diagnoses and all orders for this visit:    1. Restrictive lung disease (Primary)  Assessment & Plan:  Pulmonary functions show a slight but not significant decline in his FVC and FEV1 compared to previous studies performed in December of last year.    Orders:  -     Spirometry Without Bronchodilator  -     Full Pulmonary Function Test Without Bronchodilator; Future    2. Bronchiectasis without complication (HCC)  Assessment & Plan:  He can continue his Spiriva Respimat.  He did have an alpha-1 study which was normal.      3. Scarring of  lung  Assessment & Plan:  Again based on my review of his recent abdominal CT that include lower chest cuts the areas of scarring appear stable.  I would certainly not recommend antifibrotic therapy based on his current clinical status, his pulmonary functions, and his imaging studies.      4. Obesity (BMI 30-39.9)  Assessment & Plan:  Patient's (Body mass index is 33.32 kg/m².) indicates that they are obese (BMI >30) with health conditions that include diabetes mellitus . Weight is unchanged.  Diet and exercise are encouraged.  He will follow-up with his primary care physician regarding his elevated BMI otherwise.            Michael Stanford MD  10/20/2022  12:37 CDT    Follow Up   Return in about 1 year (around 10/20/2023) for Complete PFT, To see me specifically.    Patient was given instructions and counseling regarding his condition or for health maintenance advice. Please see specific information pulled into the AVS if appropriate.

## 2022-10-20 NOTE — ASSESSMENT & PLAN NOTE
Patient's (Body mass index is 33.32 kg/m².) indicates that they are obese (BMI >30) with health conditions that include diabetes mellitus . Weight is unchanged.  Diet and exercise are encouraged.  He will follow-up with his primary care physician regarding his elevated BMI otherwise.

## 2022-10-20 NOTE — ASSESSMENT & PLAN NOTE
Again based on my review of his recent abdominal CT that include lower chest cuts the areas of scarring appear stable.  I would certainly not recommend antifibrotic therapy based on his current clinical status, his pulmonary functions, and his imaging studies.

## 2022-10-20 NOTE — PATIENT INSTRUCTIONS
The patient's pulmonary functions are reasonly stable today and he is doing well clinically.  He does state Sudafed preparations do well for him when he needs something to clear his sinuses and I told him to try over-the-counter Mucinex D.  If he does need a prescription for this I be glad to provide it.  He is on Phenergan DM cough syrup and I told him he could use these together.  I will see him back in 1 year with complete pulmonary functions.

## 2022-10-20 NOTE — PROCEDURES
Spirometry Without Bronchodilator  Performed by: Debi Diana, RRT  Authorized by: Michael Stanford MD      Pre Drug % Predicted    FVC: 70%   FEV1: 77%   FEF 25-75%: 106%   FEV1/FVC: 82%    Interpretation   Spirometry   Spirometry shows mild restriction. midflow is normal.  Overall comments: The patient's spirometry is consistent with a mild restrictive ventilatory defect.  When current studies are compared to studies performed on December 2, 2021, there has been a slight but not significant drop in the patient's FVC and FEV1 compared to previous.  Midflows have actually improved compared to previous and are now within normal limits.

## 2022-10-20 NOTE — ASSESSMENT & PLAN NOTE
Pulmonary functions show a slight but not significant decline in his FVC and FEV1 compared to previous studies performed in December of last year.

## 2022-10-24 ENCOUNTER — TELEPHONE (OUTPATIENT)
Dept: VASCULAR SURGERY | Age: 72
End: 2022-10-24

## 2022-10-24 DIAGNOSIS — I65.23 BILATERAL CAROTID ARTERY STENOSIS: Primary | ICD-10-CM

## 2022-10-24 NOTE — TELEPHONE ENCOUNTER
Returned the phone call to the patient. I let him know that he had tests scheduled with Julieta Salinas PA-C. The patient stated that his doctor was Dr. Remi Baez and he would like me to send the information there. I am sending the referral today. Patient acknowledged.

## 2022-10-24 NOTE — TELEPHONE ENCOUNTER
Patient called to get a referral faxed to [facility] office. Please return call to advise.

## 2023-10-20 ENCOUNTER — OFFICE VISIT (OUTPATIENT)
Dept: PULMONOLOGY | Facility: CLINIC | Age: 73
End: 2023-10-20
Payer: MEDICARE

## 2023-10-20 VITALS
OXYGEN SATURATION: 96 % | HEART RATE: 71 BPM | SYSTOLIC BLOOD PRESSURE: 160 MMHG | WEIGHT: 228 LBS | HEIGHT: 70 IN | DIASTOLIC BLOOD PRESSURE: 96 MMHG | BODY MASS INDEX: 32.64 KG/M2

## 2023-10-20 DIAGNOSIS — J98.4 SCARRING OF LUNG: Chronic | ICD-10-CM

## 2023-10-20 DIAGNOSIS — J98.4 RESTRICTIVE LUNG DISEASE: ICD-10-CM

## 2023-10-20 DIAGNOSIS — J98.4 RESTRICTIVE LUNG DISEASE: Primary | Chronic | ICD-10-CM

## 2023-10-20 DIAGNOSIS — E66.9 OBESITY (BMI 30-39.9): Chronic | ICD-10-CM

## 2023-10-20 DIAGNOSIS — J47.9 BRONCHIECTASIS WITHOUT COMPLICATION: Chronic | ICD-10-CM

## 2023-10-20 RX ORDER — FENOFIBRATE 160 MG/1
1 TABLET ORAL DAILY
COMMUNITY

## 2023-10-20 RX ORDER — CETIRIZINE HYDROCHLORIDE, PSEUDOEPHEDRINE HYDROCHLORIDE 5; 120 MG/1; MG/1
1 TABLET, FILM COATED, EXTENDED RELEASE ORAL EVERY 12 HOURS PRN
COMMUNITY

## 2023-10-20 NOTE — ASSESSMENT & PLAN NOTE
Pulmonary function today reveal slight improvement in spirometry compared to previous.  He would still be considered to have a mild restrictive ventilatory defect.

## 2023-10-20 NOTE — ASSESSMENT & PLAN NOTE
Again his current PFTs show some improvement in spirometry with just a mild restrictive defect present.  I certainly do not think anything such as antifibrotic therapy would need to be considered at this time based on his current PFTs.

## 2023-10-20 NOTE — PROGRESS NOTES
Chief Complaint  Restrictive lung disease, Bronchiectasis without complication, and Scarring of lung    Subjective    History of Present Illness {CC  Problem List  Visit Diagnosis   Encounters  Notes  Medications  Labs  Result Review Imaging  Media: 23}    Eric Maloney presents to [facility] for restrictive lung disease, lung scarring, and bronchiectasis.    History of Present Illness   The patient is doing very well from a pulmonary standpoint at this time.  He has had a lot of allergy symptoms but has done much better since Dr. Enriquez prescribed Zyrtec-D.  Pulmonary function today still reveal a mild restrictive ventilatory defect but he actually has some improvement in spirometry compared to studies done on October 20, 2022.  I reviewed the studies with him.  I told him we will just continue his current regimen and I will plan on a follow-up visit with PFTs in 1 year.  He has had the COVID-19 vaccine including a standard booster and also a bivalent booster in the form of the Pfizer vaccine.  He is already had the flu shot this year and has had both a Prevnar 13 and Pneumovax in the past as well.  Prior to Admission medications    Medication Sig Start Date End Date Taking? Authorizing Provider   acetaminophen (TYLENOL) 500 MG tablet Take 1 tablet by mouth.   Yes ProviderAlin MD   aspirin 81 MG EC tablet Take 1 tablet by mouth Daily.   Yes ProviderAlin MD   cetirizine-pseudoephedrine (ZyrTEC-D) 5-120 MG per 12 hr tablet Take 1 tablet by mouth Every 12 (Twelve) Hours As Needed for Allergies.   Yes Alin Quezada MD   Cholecalciferol (VITAMIN D-3) 1000 units capsule Take  by mouth.   Yes Alin Quezada MD   Coenzyme Q10 (CO Q 10 PO) Take  by mouth.   Yes Alin Quezada MD   DiphenhydrAMINE HCl (BENADRYL PO) Take  by mouth.   Yes ProviderAlin MD   fenofibrate 160 MG tablet Take 1 tablet by mouth Daily.    "Yes Alin Quezada MD   glipiZIDE (GLUCOTROL) 5 MG tablet Every 12 (Twelve) Hours.   Yes Alin Quezada MD   levothyroxine sodium (TIROSINT) 88 MCG capsule Take 1 capsule by mouth Daily. 19  Yes Alin Quezada MD   metFORMIN (GLUCOPHAGE) 500 MG tablet Take 1 tablet by mouth 2 (Two) Times a Day With Meals.   Yes Alin Quezada MD   metoprolol tartrate (LOPRESSOR) 50 MG tablet 2 (Two) Times a Day.   Yes Alin Quezada MD   MILK THISTLE PO Take  by mouth.   Yes Alin Quezada MD   Multiple Vitamins-Minerals (MULTIVITAL PO) Take  by mouth.   Yes Alin Quezada MD   pravastatin (PRAVACHOL) 40 MG tablet pravastatin 40 mg tablet   Take 1 tablet every day by oral route.   Yes Provider, Historical, MD   Saw Palmetto, Serenoa repens, (SAW PALMETTO PO) Take  by mouth.   Yes Alin Quezada MD   Omega-3 Fatty Acids (FISH OIL) 1000 MG capsule capsule Take  by mouth.  Patient not taking: Reported on 10/20/2023    Alin Quezada MD   Red Yeast Rice Extract (RED YEAST RICE PO) Take  by mouth.  Patient not taking: Reported on 10/20/2023    Alin Quezada MD   tiotropium bromide monohydrate (Spiriva Respimat) 2.5 MCG/ACT aerosol solution inhaler Inhale 2 puffs Daily.  Patient not taking: Reported on 10/20/2023 12/2/21   Michael Stanford MD   METFORMIN HCL PO Take  by mouth.  10/20/23  Alin Quezada MD       Social History     Socioeconomic History    Marital status:    Tobacco Use    Smoking status: Former     Packs/day: 2.00     Years: 3.00     Additional pack years: 0.00     Total pack years: 6.00     Types: Cigarettes     Quit date: 1970     Years since quittin.8     Passive exposure: Past    Smokeless tobacco: Never   Vaping Use    Vaping Use: Never used   Substance and Sexual Activity    Alcohol use: No    Drug use: Defer    Sexual activity: Defer       Objective   Vital Signs:   /96   Pulse 71   Ht 177.8 cm (70\")   Wt 103 " kg (228 lb)   SpO2 96% Comment: RA  BMI 32.71 kg/m²     Physical Exam  Vitals and nursing note reviewed.   Constitutional:       Appearance: He is obese.   HENT:      Head: Normocephalic and atraumatic.   Eyes:      Extraocular Movements: Extraocular movements intact.      Pupils: Pupils are equal, round, and reactive to light.   Cardiovascular:      Rate and Rhythm: Normal rate and regular rhythm.   Pulmonary:      Effort: Pulmonary effort is normal.      Breath sounds: Normal breath sounds.   Musculoskeletal:         General: Normal range of motion.   Skin:     General: Skin is warm and dry.   Neurological:      General: No focal deficit present.      Mental Status: He is alert and oriented to person, place, and time.   Psychiatric:         Mood and Affect: Mood normal.         Behavior: Behavior normal.        Result Review :    PFT Values          2021    09:00 10/20/2022    09:15 10/20/2023    10:00   Pre Drug PFT Results   FVC 77 70 79   FEV1 76 77 78   FEF 25-75% 66 106 72   FEV1/FVC 76.86 82 77.88   Other Tests PFT Results   TLC   69   RV   59   DLCO   92   D/VAsb   123         Results for orders placed in visit on 10/20/23    Full Pulmonary Function Test Without Bronchodilator    Narrative  Full Pulmonary Function Test Without Bronchodilator    Performed by: Cornelio Worthington CMA  Authorized by: Michael Stanford MD  Pre Drug % Predicted  FVC: 79%  FEV1: 78%  FEF 25-75%: 72%  FEV1/FVC: 77.88%  T%  RV: 59%  DLCO: 92%  D/VAsb: 123%    Interpretation  Spirometry  Spirometry shows mild restriction. There is reduced midflow suggesting small airway/airflow obstruction.  Lung Volume Measurements  Measurements show reduced lung volumes consistent with restriction.  Diffusion Capacity  The patient's diffusion capacity is normal.  Diffusion capacity is normal when corrected for alveolar volume.  Overall comments: The patient's spirometry is consistent with a mild restrictive ventilatory defect with a  coexisting decrease in FEF Max and midflows.  Lung volumes confirm a mild restrictive ventilatory defect.  There is also a mild decrease in inspiratory capacity.  Diffusion capacity is within normal limits and when corrected for alveolar volume is actually supranormal.  When current studies are compared to studies performed on October 20, 2022, the patient's current spirometry reveals slight improvement in both his FVC and FEV1 compared to previous.      Results for orders placed in visit on 10/20/22    Spirometry Without Bronchodilator    Narrative  Spirometry Without Bronchodilator  Performed by: Debi Diana, RRT  Authorized by: Michael Stanford MD    Pre Drug % Predicted  FVC: 70%  FEV1: 77%  FEF 25-75%: 106%  FEV1/FVC: 82%    Interpretation  Spirometry  Spirometry shows mild restriction. midflow is normal.  Overall comments: The patient's spirometry is consistent with a mild restrictive ventilatory defect.  When current studies are compared to studies performed on December 2, 2021, there has been a slight but not significant drop in the patient's FVC and FEV1 compared to previous.  Midflows have actually improved compared to previous and are now within normal limits.      Results for orders placed in visit on 12/02/21    Spirometry Without Bronchodilator    Narrative  Spirometry Without Bronchodilator  Performed by: Debi Diana, RRT  Authorized by: Michael Stanford MD    Pre Drug % Predicted  FVC: 77%  FEV1: 76%  FEF 25-75%: 66%  FEV1/FVC: 76.86%    Interpretation  Spirometry  Spirometry shows mild restriction. There is reduced midflow suggesting small airway/airflow obstruction.  Overall comments: Spirometry is consistent with a mild restrictive ventilatory defect with a coexisting decrease in midflows consistent with small airways disease.  When current studies are compared to studies done at [facility] on September 29 of this year, there has been very slight  improvement in his FVC and FEV1 compared to previous prebronchodilator studies.                 My interpretation of imaging:    CT Chest Hi Resolution Diagnostic (09/29/2021 11:03)       Assessment and Plan {CC Problem List  Visit Diagnosis  ROS  Review (Popup)  Health Maintenance  Quality  BestPractice  Medications  SmartSets  SnapShot Encounters  Media : 23}    Diagnoses and all orders for this visit:    1. Restrictive lung disease (Primary)  Assessment & Plan:  Pulmonary function today reveal slight improvement in spirometry compared to previous.  He would still be considered to have a mild restrictive ventilatory defect.    Orders:  -     Spirometry with Diffusion Capacity & Lung Volumes; Future    2. Scarring of lung  Assessment & Plan:  Again his current PFTs show some improvement in spirometry with just a mild restrictive defect present.  I certainly do not think anything such as antifibrotic therapy would need to be considered at this time based on his current PFTs.      3. Bronchiectasis without complication  Assessment & Plan:  He does not have any major problems with cough or congestion at present.  He may utilize an over-the-counter guaifenesin preparation as needed for congestion.      4. Obesity (BMI 30-39.9)  Assessment & Plan:  Patient's (Body mass index is 32.71 kg/m².) indicates that they are obese (BMI >30) with health conditions that include dyslipidemias . Weight is unchanged.  Diet and exercise encouraged and he will follow-up with his primary care physician regarding his elevated BMI otherwise.            Michael Stanford MD  10/20/2023  13:41 CDT    Follow Up   Return in about 1 year (around 10/20/2024) for Complete PFT, To see me specifically.    Patient was given instructions and counseling regarding his condition or for health maintenance advice. Please see specific information pulled into the AVS if appropriate.

## 2023-10-20 NOTE — ASSESSMENT & PLAN NOTE
He does not have any major problems with cough or congestion at present.  He may utilize an over-the-counter guaifenesin preparation as needed for congestion.

## 2023-10-20 NOTE — PROCEDURES
Full Pulmonary Function Test Without Bronchodilator    Performed by: Cornelio Worthington CMA  Authorized by: Michael Stanford MD     Pre Drug % Predicted    FVC: 79%   FEV1: 78%   FEF 25-75%: 72%   FEV1/FVC: 77.88%   T%   RV: 59%   DLCO: 92%   D/VAsb: 123%    Interpretation   Spirometry   Spirometry shows mild restriction. There is reduced midflow suggesting small airway/airflow obstruction.   Lung Volume Measurements  Measurements show reduced lung volumes consistent with restriction.   Diffusion Capacity  The patient's diffusion capacity is normal.  Diffusion capacity is normal when corrected for alveolar volume.   Overall comments: The patient's spirometry is consistent with a mild restrictive ventilatory defect with a coexisting decrease in FEF Max and midflows.  Lung volumes confirm a mild restrictive ventilatory defect.  There is also a mild decrease in inspiratory capacity.  Diffusion capacity is within normal limits and when corrected for alveolar volume is actually supranormal.  When current studies are compared to studies performed on 2022, the patient's current spirometry reveals slight improvement in both his FVC and FEV1 compared to previous.

## 2023-10-20 NOTE — ASSESSMENT & PLAN NOTE
Patient's (Body mass index is 32.71 kg/m².) indicates that they are obese (BMI >30) with health conditions that include dyslipidemias . Weight is unchanged.  Diet and exercise encouraged and he will follow-up with his primary care physician regarding his elevated BMI otherwise.

## 2023-10-20 NOTE — PATIENT INSTRUCTIONS
The patient is doing very well clinically.  He was placed on Zyrtec-D by Dr. Enriquez and this is helped his allergy symptoms significantly.  Pulmonary function today actually shows some improvement in spirometry.  I will just plan on follow-up visit in 1 year with pulmonary functions.

## 2024-10-14 ENCOUNTER — OFFICE VISIT (OUTPATIENT)
Age: 74
End: 2024-10-14
Payer: MEDICARE

## 2024-10-14 VITALS — WEIGHT: 220 LBS | HEIGHT: 71 IN | BODY MASS INDEX: 30.8 KG/M2

## 2024-10-14 DIAGNOSIS — L60.0 INGROWN NAIL: ICD-10-CM

## 2024-10-14 DIAGNOSIS — Z47.89 AFTERCARE FOLLOWING SURGERY OF THE MUSCULOSKELETAL SYSTEM: Primary | ICD-10-CM

## 2024-10-14 PROCEDURE — 1123F ACP DISCUSS/DSCN MKR DOCD: CPT | Performed by: PODIATRIST

## 2024-10-14 PROCEDURE — G8427 DOCREV CUR MEDS BY ELIG CLIN: HCPCS | Performed by: PODIATRIST

## 2024-10-14 PROCEDURE — G8417 CALC BMI ABV UP PARAM F/U: HCPCS | Performed by: PODIATRIST

## 2024-10-14 PROCEDURE — 99212 OFFICE O/P EST SF 10 MIN: CPT | Performed by: PODIATRIST

## 2024-10-14 PROCEDURE — G8484 FLU IMMUNIZE NO ADMIN: HCPCS | Performed by: PODIATRIST

## 2024-10-14 PROCEDURE — 1036F TOBACCO NON-USER: CPT | Performed by: PODIATRIST

## 2024-10-14 PROCEDURE — 3017F COLORECTAL CA SCREEN DOC REV: CPT | Performed by: PODIATRIST

## 2024-10-14 RX ORDER — LANOLIN ALCOHOL/MO/W.PET/CERES
CREAM (GRAM) TOPICAL DAILY
COMMUNITY

## 2024-10-14 NOTE — PROGRESS NOTES
were given.  Return to clinic as needed      Return if symptoms worsen or fail to improve.      Patient given educational materials - see patient instructions.   Discussed use, benefit, and side effects of prescribed medications.  All patient questions answered.  Pt voiced understanding. Patient agreed with treatment plan. Follow up as needed.    This dictation was generated by voice recognition computer software. Although all attempts are made to edit the dictation for accuracy, there may be errors in the transcription that are not intended.    Electronically signed by Brad Nair II, DPM on 10/14/2024 at 3:21 PM

## 2024-10-22 ENCOUNTER — OFFICE VISIT (OUTPATIENT)
Dept: PULMONOLOGY | Facility: CLINIC | Age: 74
End: 2024-10-22
Payer: MEDICARE

## 2024-10-22 VITALS
WEIGHT: 220 LBS | SYSTOLIC BLOOD PRESSURE: 136 MMHG | HEART RATE: 67 BPM | OXYGEN SATURATION: 95 % | DIASTOLIC BLOOD PRESSURE: 80 MMHG | HEIGHT: 70 IN | BODY MASS INDEX: 31.5 KG/M2

## 2024-10-22 DIAGNOSIS — J98.4 RESTRICTIVE LUNG DISEASE: Primary | ICD-10-CM

## 2024-10-22 DIAGNOSIS — J98.4 SCARRING OF LUNG: Chronic | ICD-10-CM

## 2024-10-22 DIAGNOSIS — Z77.090 HISTORY OF ASBESTOS EXPOSURE: Chronic | ICD-10-CM

## 2024-10-22 DIAGNOSIS — J98.4 RESTRICTIVE LUNG DISEASE: Primary | Chronic | ICD-10-CM

## 2024-10-22 DIAGNOSIS — E66.9 OBESITY (BMI 30-39.9): Chronic | ICD-10-CM

## 2024-10-22 NOTE — PATIENT INSTRUCTIONS
His pulmonary function today still show mild restrictive ventilatory defect without any real significant change dating back to 2019 I reviewed the studies with him.  I will just plan on a follow-up visit in 1 year.

## 2024-10-22 NOTE — ASSESSMENT & PLAN NOTE
Again to my review his most recent CT revealed some mild bronchiectatic changes particular at the right base posteriorly.

## 2024-10-22 NOTE — ASSESSMENT & PLAN NOTE
Again this could account for the fibrotic changes noted on his prior chest CT scans.  He is not interested in any follow-up imaging studies at this time.  Also based on the relative stability of his pulmonary functions over a 5-year period of time I would definitely not recommend any antifibrotic therapy.

## 2024-10-22 NOTE — PROGRESS NOTES
Chief Complaint  Restrictive lung disease, Bronchiectasis without complication, and Scarring of lung    Subjective    History of Present Illness {CC  Problem List  Visit Diagnosis   Encounters  Notes  Medications  Labs  Result Review Imaging  Media: 23}    Eric Maloney presents to [facility] PULMONARY [facility]ICAL [facility]ARE MEDICINE for restrictive lung disease, bronchiectasis, and lung scarring.    History of Present Illness   The patient is stable clinically from a pulmonary standpoint.  Pulmonary function today are consistent with a mild restrictive ventilatory defect on spirometry which is confirmed on lung volumes.  He has a mild diffusion impairment which when corrected for alveolar volume is normalized.  His studies show a slight decline in spirometry compared to studies performed on October 20 of last year.  His total lung capacity has actually improved compared to previous and his diffusion capacity is dropped somewhat but again is still normal.  When studies are compared to studies from 5 years ago again has had a drop in his FVC nephron compared to previous but basically no change in his total lung capacity and diffusion capacity corrected for alveolar volume.  Again his studies show reasonably stable pattern over 5 years.  I do think the restriction relates to his lung scarring and his weight could also contribute but likely it is most likely from his lung scarring.  His last CT did show some peripheral fibrotic changes which could relate to his prior asbestos exposure history.  The scan was read as no bronchiectasis but he clearly has some bronchiectasis particularly in the right lower lung posteriorly although it appears fairly mild.  I did discuss the possibly of a follow-up CT with him and he prefers to hold off.  I told him we would just see him in 1 year with follow-up PFTs.  He has had the COVID-19 vaccine including a standard booster, a bivalent booster, and a Comirnaty  booster in the form of the Pfizer vaccine.  He has had a Prevnar 13, Pneumovax, and RSV vaccine in the past as well.  Prior to Admission medications    Medication Sig Start Date End Date Taking? Authorizing Provider   acetaminophen (TYLENOL) 500 MG tablet Take 1 tablet by mouth.   Yes Alin Quezada MD   aspirin 81 MG EC tablet Take 1 tablet by mouth Daily.   Yes Alin Quezada MD   cetirizine-pseudoephedrine (ZyrTEC-D) 5-120 MG per 12 hr tablet Take 1 tablet by mouth Every 12 (Twelve) Hours As Needed for Allergies.   Yes Alin Quezada MD   Cholecalciferol (VITAMIN D-3) 1000 units capsule Take  by mouth.   Yes Alin Quezada MD   Coenzyme Q10 (CO Q 10 PO) Take  by mouth.   Yes Alin Quezada MD   DiphenhydrAMINE HCl (BENADRYL PO) Take  by mouth.   Yes Alin Quezada MD   fenofibrate 160 MG tablet Take 1 tablet by mouth Daily.   Yes Alin Quezada MD   glipiZIDE (GLUCOTROL) 5 MG tablet Every 12 (Twelve) Hours.   Yes Alin Quezada MD   levothyroxine sodium (TIROSINT) 88 MCG capsule Take 1 capsule by mouth Daily. 8/1/19  Yes Alin Quezada MD   metFORMIN (GLUCOPHAGE) 500 MG tablet Take 1 tablet by mouth 2 (Two) Times a Day With Meals.   Yes Alin Quezada MD   metoprolol tartrate (LOPRESSOR) 50 MG tablet 2 (Two) Times a Day.   Yes Alin Quezada MD   MILK THISTLE PO Take  by mouth.   Yes Alin Quezada MD   Multiple Vitamins-Minerals (MULTIVITAL PO) Take  by mouth.   Yes Alin Quezada MD   Omega-3 Fatty Acids (FISH OIL) 1000 MG capsule capsule Take  by mouth.   Yes Alin Quezada MD   pravastatin (PRAVACHOL) 40 MG tablet pravastatin 40 mg tablet   Take 1 tablet every day by oral route.   Yes Provider, Historical, MD   Saw Palmetto, Serenoa repens, (SAW PALMETTO PO) Take  by mouth.   Yes Alin Quezada MD   Red Yeast Rice Extract (RED YEAST RICE PO) Take  by mouth.  Patient not taking: Reported on 10/22/2024     "Provider, MD Alin   tiotropium bromide monohydrate (Spiriva Respimat) 2.5 MCG/ACT aerosol solution inhaler Inhale 2 puffs Daily.  Patient not taking: Reported on 10/22/2024 12/2/21   Michael Stanford MD       Social History     Socioeconomic History    Marital status:    Tobacco Use    Smoking status: Former     Current packs/day: 0.00     Average packs/day: 2.0 packs/day for 3.0 years (6.0 ttl pk-yrs)     Types: Cigarettes     Start date:      Quit date: 1970     Years since quittin.8     Passive exposure: Past    Smokeless tobacco: Never   Vaping Use    Vaping status: Never Used   Substance and Sexual Activity    Alcohol use: No    Drug use: Defer    Sexual activity: Defer       Objective   Vital Signs:   /80   Pulse 67   Ht 177.8 cm (70\")   Wt 99.8 kg (220 lb)   SpO2 95% Comment: RA  BMI 31.57 kg/m²     Physical Exam  Vitals and nursing note reviewed.   Constitutional:       Appearance: He is obese.   HENT:      Head: Normocephalic.   Eyes:      Extraocular Movements: Extraocular movements intact.      Pupils: Pupils are equal, round, and reactive to light.   Cardiovascular:      Rate and Rhythm: Normal rate and regular rhythm.   Pulmonary:      Effort: Pulmonary effort is normal.      Comments: Lung fields are clear with reasonable air movement bilaterally.  Musculoskeletal:         General: Normal range of motion.   Skin:     General: Skin is warm and dry.   Neurological:      General: No focal deficit present.      Mental Status: He is alert and oriented to person, place, and time.   Psychiatric:         Mood and Affect: Mood normal.         Behavior: Behavior normal.        Result Review :    PFT Values          10/20/2023    10:00 10/22/2024    10:00   Pre Drug PFT Results   FVC 79 72   FEV1 78 74   FEF 25-75% 72 88   FEV1/FVC 77.88 77   Other Tests PFT Results   TLC 69 70   RV 59 79   DLCO 92 76   D/VAsb 123 111         Results for orders placed in visit on " 10/22/24    Spirometry with Diffusion Capacity & Lung Volumes    Narrative  Spirometry with Diffusion Capacity & Lung Volumes    Performed by: Debi Diana, RRT  Authorized by: Michael Stanford MD  Pre Drug % Predicted  FVC: 72%  FEV1: 74%  FEF 25-75%: 88%  FEV1/FVC: 77%  T%  RV: 79%  DLCO: 76%  D/VAsb: 111%    Interpretation  Spirometry  Spirometry shows mild restriction.  Lung Volume Measurements  Measurements show reduced lung volumes consistent with restriction.  Diffusion Capacity  The patient's diffusion capacity is mildly reduced.  Diffusion capacity is normal when corrected for alveolar volume.  Overall comments: The patient's spirometry is consistent with a mild restrictive ventilatory defect.  Lung volumes confirm a mild restrictive ventilatory defect.  There is a mild diffusion impairment which when corrected for alveolar volume is normalized.  When current studies are compared to studies from 2023, there has been a slight decline in the patient's FVC and FEV1 compared to previous but actually improvement in his total lung capacity compared to previous.  When corrected for alveolar volume there has been a decline in diffusion capacity compared to previous although it remains within normal limits.  When current studies are compared to studies from 2019, the patient's current baseline spirometry again reveals a decline in the FVC and FEV1 compared to previous.  The patient's total lung capacity and diffusion capacity when corrected for alveolar volume are essentially unchanged compared to previous.      Results for orders placed in visit on 10/20/23    Full Pulmonary Function Test Without Bronchodilator    Narrative  Full Pulmonary Function Test Without Bronchodilator    Performed by: Cornelio Worthington CMA  Authorized by: Michael Stanford MD  Pre Drug % Predicted  FVC: 79%  FEV1: 78%  FEF 25-75%: 72%  FEV1/FVC: 77.88%  T%  RV: 59%  DLCO: 92%  D/VAsb:  123%    Interpretation  Spirometry  Spirometry shows mild restriction. There is reduced midflow suggesting small airway/airflow obstruction.  Lung Volume Measurements  Measurements show reduced lung volumes consistent with restriction.  Diffusion Capacity  The patient's diffusion capacity is normal.  Diffusion capacity is normal when corrected for alveolar volume.  Overall comments: The patient's spirometry is consistent with a mild restrictive ventilatory defect with a coexisting decrease in FEF Max and midflows.  Lung volumes confirm a mild restrictive ventilatory defect.  There is also a mild decrease in inspiratory capacity.  Diffusion capacity is within normal limits and when corrected for alveolar volume is actually supranormal.  When current studies are compared to studies performed on October 20, 2022, the patient's current spirometry reveals slight improvement in both his FVC and FEV1 compared to previous.      Results for orders placed in visit on 10/20/22    Spirometry Without Bronchodilator    Narrative  Spirometry Without Bronchodilator  Performed by: Debi Diana, RRT  Authorized by: Michael Stanford MD    Pre Drug % Predicted  FVC: 70%  FEV1: 77%  FEF 25-75%: 106%  FEV1/FVC: 82%    Interpretation  Spirometry  Spirometry shows mild restriction. midflow is normal.  Overall comments: The patient's spirometry is consistent with a mild restrictive ventilatory defect.  When current studies are compared to studies performed on December 2, 2021, there has been a slight but not significant drop in the patient's FVC and FEV1 compared to previous.  Midflows have actually improved compared to previous and are now within normal limits.                 My interpretation of imaging:    CT Chest Hi Resolution Diagnostic (09/29/2021 11:03)         Assessment and Plan {CC Problem List  Visit Diagnosis  ROS  Review (Popup)  Health Maintenance  Quality  BestPractice  Medications  SmartSets   SnapShot Encounters  Media : 23}    Diagnoses and all orders for this visit:    1. Restrictive lung disease (Primary)  Assessment & Plan:  Again his pulmonary functions today are consistent with a mild restrictive ventilatory defect.    Orders:  -     Spirometry with Diffusion Capacity & Lung Volumes  -     Spirometry with Diffusion Capacity & Lung Volumes; Future    2. Scarring of lung  Assessment & Plan:  This certainly could relate to his previous asbestos exposure.  He is not interested in any follow-up imaging studies at this time.      3. History of asbestos exposure  Assessment & Plan:  Again this could account for the fibrotic changes noted on his prior chest CT scans.  He is not interested in any follow-up imaging studies at this time.  Also based on the relative stability of his pulmonary functions over a 5-year period of time I would definitely not recommend any antifibrotic therapy.      4. Obesity (BMI 30-39.9)  Assessment & Plan:  Patient's (Body mass index is 31.57 kg/m².) indicates that they are obese (BMI >30) with health conditions that include dyslipidemias . Weight is unchanged.  Diet and exercise are encouraged he will follow-up with his primary care physician regarding his elevated BMI otherwise.            Michael Stanford MD  10/22/2024  12:18 CDT    Follow Up   Return in about 1 year (around 10/22/2025) for To see me specifically, Complete PFT.    Patient was given instructions and counseling regarding his condition or for health maintenance advice. Please see specific information pulled into the AVS if appropriate.

## 2024-10-22 NOTE — ASSESSMENT & PLAN NOTE
This certainly could relate to his previous asbestos exposure.  He is not interested in any follow-up imaging studies at this time.

## 2024-10-22 NOTE — ASSESSMENT & PLAN NOTE
Patient's (Body mass index is 31.57 kg/m².) indicates that they are obese (BMI >30) with health conditions that include dyslipidemias . Weight is unchanged.  Diet and exercise are encouraged he will follow-up with his primary care physician regarding his elevated BMI otherwise.

## 2024-10-22 NOTE — PROCEDURES
Spirometry with Diffusion Capacity & Lung Volumes    Performed by: Debi Diana, RRT  Authorized by: Michael Stanford MD     Pre Drug % Predicted    FVC: 72%   FEV1: 74%   FEF 25-75%: 88%   FEV1/FVC: 77%   T%   RV: 79%   DLCO: 76%   D/VAsb: 111%    Interpretation   Spirometry   Spirometry shows mild restriction.   Lung Volume Measurements  Measurements show reduced lung volumes consistent with restriction.   Diffusion Capacity  The patient's diffusion capacity is mildly reduced.  Diffusion capacity is normal when corrected for alveolar volume.   Overall comments: The patient's spirometry is consistent with a mild restrictive ventilatory defect.  Lung volumes confirm a mild restrictive ventilatory defect.  There is a mild diffusion impairment which when corrected for alveolar volume is normalized.  When current studies are compared to studies from 2023, there has been a slight decline in the patient's FVC and FEV1 compared to previous but actually improvement in his total lung capacity compared to previous.  When corrected for alveolar volume there has been a decline in diffusion capacity compared to previous although it remains within normal limits.  When current studies are compared to studies from 2019, the patient's current baseline spirometry again reveals a decline in the FVC and FEV1 compared to previous.  The patient's total lung capacity and diffusion capacity when corrected for alveolar volume are essentially unchanged compared to previous.

## 2024-12-05 ENCOUNTER — HOSPITAL ENCOUNTER (OUTPATIENT)
Dept: WOUND CARE | Age: 74
Discharge: HOME | End: 2024-12-05
Attending: NURSE PRACTITIONER
Payer: MEDICARE

## 2024-12-05 VITALS
HEART RATE: 73 BPM | RESPIRATION RATE: 16 BRPM | SYSTOLIC BLOOD PRESSURE: 140 MMHG | DIASTOLIC BLOOD PRESSURE: 76 MMHG | BODY MASS INDEX: 30.8 KG/M2 | TEMPERATURE: 97.1 F | WEIGHT: 220 LBS | HEIGHT: 71 IN

## 2024-12-05 DIAGNOSIS — L08.9 TOE INFECTION: Primary | ICD-10-CM

## 2024-12-05 DIAGNOSIS — E08.11 DIABETES MELLITUS DUE TO UNDERLYING CONDITION WITH KETOACIDOTIC COMA, WITHOUT LONG-TERM CURRENT USE OF INSULIN (HCC): ICD-10-CM

## 2024-12-05 PROCEDURE — 99212 OFFICE O/P EST SF 10 MIN: CPT

## 2024-12-05 PROCEDURE — 99213 OFFICE O/P EST LOW 20 MIN: CPT | Performed by: NURSE PRACTITIONER

## 2024-12-05 RX ORDER — GINSENG 100 MG
CAPSULE ORAL ONCE
OUTPATIENT
Start: 2024-12-05 | End: 2024-12-05

## 2024-12-05 RX ORDER — TRIAMCINOLONE ACETONIDE 1 MG/G
OINTMENT TOPICAL ONCE
OUTPATIENT
Start: 2024-12-05 | End: 2024-12-05

## 2024-12-05 RX ORDER — LIDOCAINE HYDROCHLORIDE 20 MG/ML
JELLY TOPICAL ONCE
OUTPATIENT
Start: 2024-12-05 | End: 2024-12-05

## 2024-12-05 RX ORDER — LIDOCAINE HYDROCHLORIDE 40 MG/ML
SOLUTION TOPICAL ONCE
OUTPATIENT
Start: 2024-12-05 | End: 2024-12-05

## 2024-12-05 RX ORDER — BETAMETHASONE DIPROPIONATE 0.5 MG/G
CREAM TOPICAL ONCE
OUTPATIENT
Start: 2024-12-05 | End: 2024-12-05

## 2024-12-05 RX ORDER — BACITRACIN ZINC AND POLYMYXIN B SULFATE 500; 1000 [USP'U]/G; [USP'U]/G
OINTMENT TOPICAL ONCE
OUTPATIENT
Start: 2024-12-05 | End: 2024-12-05

## 2024-12-05 RX ORDER — NEOMYCIN/BACITRACIN/POLYMYXINB 3.5-400-5K
OINTMENT (GRAM) TOPICAL ONCE
OUTPATIENT
Start: 2024-12-05 | End: 2024-12-05

## 2024-12-05 RX ORDER — SILVER SULFADIAZINE 10 MG/G
CREAM TOPICAL ONCE
OUTPATIENT
Start: 2024-12-05 | End: 2024-12-05

## 2024-12-05 RX ORDER — GENTAMICIN SULFATE 1 MG/G
OINTMENT TOPICAL ONCE
OUTPATIENT
Start: 2024-12-05 | End: 2024-12-05

## 2024-12-05 RX ORDER — CLOBETASOL PROPIONATE 0.5 MG/G
OINTMENT TOPICAL ONCE
OUTPATIENT
Start: 2024-12-05 | End: 2024-12-05

## 2024-12-05 RX ORDER — LIDOCAINE 40 MG/G
CREAM TOPICAL ONCE
OUTPATIENT
Start: 2024-12-05 | End: 2024-12-05

## 2024-12-05 RX ORDER — LIDOCAINE 50 MG/G
OINTMENT TOPICAL ONCE
OUTPATIENT
Start: 2024-12-05 | End: 2024-12-05

## 2024-12-05 RX ORDER — MUPIROCIN 20 MG/G
OINTMENT TOPICAL ONCE
OUTPATIENT
Start: 2024-12-05 | End: 2024-12-05

## 2024-12-05 RX ORDER — CEPHALEXIN 500 MG/1
500 CAPSULE ORAL 4 TIMES DAILY
Qty: 28 CAPSULE | Refills: 0 | Status: SHIPPED | OUTPATIENT
Start: 2024-12-05 | End: 2024-12-12

## 2024-12-05 RX ORDER — SODIUM CHLOR/HYPOCHLOROUS ACID 0.033 %
SOLUTION, IRRIGATION IRRIGATION ONCE
OUTPATIENT
Start: 2024-12-05 | End: 2024-12-05

## 2024-12-05 ASSESSMENT — ENCOUNTER SYMPTOMS: COLOR CHANGE: 1

## 2024-12-05 ASSESSMENT — VISUAL ACUITY: OU: 1

## 2024-12-05 NOTE — DISCHARGE INSTRUCTIONS
[facility] Wound Care and Hyperbaric Oxygen Therapy   Physician Orders and Discharge Instructions  [address] [place] [address]  [address]  [place] [address]  Telephone: [phone]      FAX [phone]    NAME:  Mark Rothman  YOB: 1950  MEDICAL RECORD NUMBER:  [id number]  DATE:  12/5/2024    Discharge condition: Stable    Discharge to: Home    Left via:Private automobile    Accompanied by:  self    Left toe redness: soap and water wash as normal, do not apply anything around the toe-leave open to air    Diabetic Foot Care    Diabetes can lead to many different types of foot complications, including athlete's foot (a fungal infection), calluses, bunions and other foot deformities, or ulcers that can range from a surface wound to a deep infection.  You will need to check your feet twice daily and give them special care and attention.    1) Wash with lukewarm water and a mild soap.  Dry well between your toes. Do not soak your feet unless instructed to do so by a physician.        Moisturize your feet with a good thick cream like Eucerin Cream, Aquaphor or Cocoa Butter (in a jar) at least twice daily. Do not apply moisturizer between toes.    2)  Protect your feet and never go barefoot.  Wear slippers around the house. Treat even minor wounds and skin cracks as an urgent matter when you have diabetes.  Remember early treatment is essential to avoid more advanced problems.    3) Check your feet daily or have someone else check them if your eyesight is limited.  If you live alone try using a handheld mirror.  Watch for a red spot that persists or callus formation.     4)  Look and feel inside your shoes before putting them on.  Inspect the soles for nails or screws.       5)  If you form callus or thickened skin on your feet use Flexitol Heel Balm once daily alternating with your regular moisturizer.  Flexitol Heel [place] is available at [facility] ($12) and other pharmacies.    6) If you 
Opt out

## 2024-12-05 NOTE — PATIENT INSTRUCTIONS
form callus this is a sign that this area is getting too much pressure or rubbing in your shoe. You may use a pumice stone gently to any callus after a shower 3 times weekly. Be very careful as you will not feel it if you rub too hard.     7) Try wearing two pairs of white cotton socks.  Wear a thin pair inside out so the seam is not on your toes.  Wear the other pair over the thin pair.  This will sometimes stop the friction and rubbing.      8)  If you get a callus you will need your inserts, or your shoe adjusted by the orthotist where your shoes were made. Take them back immediately, do not wait.  It is best to get your shoes from a company that specializes in Prosthetics and Orthotics.  These companies have an Orthotist on staff who can work on your shoes when needed.  Just because they are Diabetic shoes does not mean they will be perfect when you get them. They may need to be stretched or need the insoles reworked and you need a company that offers this (on site) after the sale of your shoe.      9) Callus is never a good thing on a diabetic or neuropathic foot.  This is where wounds come from. The callus can dry and crack letting bacteria enter your foot leading to an infection of the area and ulceration.    10) Diabetics should have new shoes yearly. (This prescription can be obtained from your primary care physician who does your foot exams) You will also get 3 pairs of insoles with your shoes.  As your feet change through the course of the year you may need to get your insoles or even your shoe adjusted by the orthotist.  Please take advantage of their expertise and keep your footwear in good shape. Break in new shoes slowly, just a few hours a day.  Watch for red spots, areas that rub or callus.    11) As a diabetic you also need to see a Podiatrist every two months for a toenail trim. Your insurance should pay for this. Avoid trimming your toenails yourself. If you must, they should be trimmed straight

## 2024-12-05 NOTE — PROGRESS NOTES
140/76   Pulse 73   Temp 97.1 °F (36.2 °C) (Temporal)   Resp 16   Ht 1.81 m (5' 11.26\")   Wt 99.8 kg (220 lb)   BMI 30.46 kg/m²     Physical Exam  Vitals reviewed. Exam conducted with a chaperone present.   Constitutional:       Appearance: He is normal weight.   HENT:      Head: Normocephalic and atraumatic.      Right Ear: External ear normal.      Left Ear: External ear normal.   Eyes:      General: Lids are normal. Lids are everted, no foreign bodies appreciated. Vision grossly intact. Gaze aligned appropriately.   Cardiovascular:      Rate and Rhythm: Normal rate and regular rhythm.      Pulses: Normal pulses.      Heart sounds: Normal heart sounds.   Pulmonary:      Effort: Pulmonary effort is normal.      Breath sounds: Normal breath sounds.   Musculoskeletal:         General: Normal range of motion.        Feet:    Feet:      Left foot:      Skin integrity: Erythema present.   Skin:     General: Skin is warm and dry.      Capillary Refill: Capillary refill takes 2 to 3 seconds.      Findings: Erythema present.   Neurological:      Mental Status: He is alert and oriented to person, place, and time.   Psychiatric:         Mood and Affect: Mood normal.         Behavior: Behavior normal.         Thought Content: Thought content normal.         Judgment: Judgment normal.             Post Debridement Measurements and Assessment:    The patientspain is   .    Assessment    1. Toe infection    2. 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  Patient understanding and questions answered.     I spent a total of  20 minutes face to face with the patient. Over 100% of that time was spent on counseling and care coordination.      Patient was told that if symptoms worsen or new symptoms develop they are to go to the emergency department immediately. Patient was educated on

## 2024-12-12 ENCOUNTER — HOSPITAL ENCOUNTER (OUTPATIENT)
Dept: WOUND CARE | Age: 74
Discharge: HOME | End: 2024-12-12
Attending: NURSE PRACTITIONER
Payer: MEDICARE

## 2024-12-12 VITALS
RESPIRATION RATE: 18 BRPM | TEMPERATURE: 97.3 F | BODY MASS INDEX: 30.8 KG/M2 | HEIGHT: 71 IN | DIASTOLIC BLOOD PRESSURE: 82 MMHG | HEART RATE: 81 BPM | WEIGHT: 220 LBS | SYSTOLIC BLOOD PRESSURE: 134 MMHG

## 2024-12-12 DIAGNOSIS — L08.9 TOE INFECTION: Primary | ICD-10-CM

## 2024-12-12 PROCEDURE — 99212 OFFICE O/P EST SF 10 MIN: CPT

## 2024-12-12 PROCEDURE — 99212 OFFICE O/P EST SF 10 MIN: CPT | Performed by: NURSE PRACTITIONER

## 2024-12-12 RX ORDER — BETAMETHASONE DIPROPIONATE 0.5 MG/G
CREAM TOPICAL ONCE
Status: CANCELLED | OUTPATIENT
Start: 2024-12-12 | End: 2024-12-12

## 2024-12-12 RX ORDER — GENTAMICIN SULFATE 1 MG/G
OINTMENT TOPICAL ONCE
Status: CANCELLED | OUTPATIENT
Start: 2024-12-12 | End: 2024-12-12

## 2024-12-12 RX ORDER — GINSENG 100 MG
CAPSULE ORAL ONCE
Status: CANCELLED | OUTPATIENT
Start: 2024-12-12 | End: 2024-12-12

## 2024-12-12 RX ORDER — NEOMYCIN/BACITRACIN/POLYMYXINB 3.5-400-5K
OINTMENT (GRAM) TOPICAL ONCE
Status: CANCELLED | OUTPATIENT
Start: 2024-12-12 | End: 2024-12-12

## 2024-12-12 RX ORDER — SILVER SULFADIAZINE 10 MG/G
CREAM TOPICAL ONCE
Status: CANCELLED | OUTPATIENT
Start: 2024-12-12 | End: 2024-12-12

## 2024-12-12 RX ORDER — LIDOCAINE HYDROCHLORIDE 20 MG/ML
JELLY TOPICAL ONCE
Status: CANCELLED | OUTPATIENT
Start: 2024-12-12 | End: 2024-12-12

## 2024-12-12 RX ORDER — LIDOCAINE HYDROCHLORIDE 40 MG/ML
SOLUTION TOPICAL ONCE
Status: CANCELLED | OUTPATIENT
Start: 2024-12-12 | End: 2024-12-12

## 2024-12-12 RX ORDER — LIDOCAINE 40 MG/G
CREAM TOPICAL ONCE
Status: CANCELLED | OUTPATIENT
Start: 2024-12-12 | End: 2024-12-12

## 2024-12-12 RX ORDER — CLOBETASOL PROPIONATE 0.5 MG/G
OINTMENT TOPICAL ONCE
Status: CANCELLED | OUTPATIENT
Start: 2024-12-12 | End: 2024-12-12

## 2024-12-12 RX ORDER — TRIAMCINOLONE ACETONIDE 1 MG/G
OINTMENT TOPICAL ONCE
Status: CANCELLED | OUTPATIENT
Start: 2024-12-12 | End: 2024-12-12

## 2024-12-12 RX ORDER — MUPIROCIN 20 MG/G
OINTMENT TOPICAL ONCE
Status: CANCELLED | OUTPATIENT
Start: 2024-12-12 | End: 2024-12-12

## 2024-12-12 RX ORDER — SODIUM CHLOR/HYPOCHLOROUS ACID 0.033 %
SOLUTION, IRRIGATION IRRIGATION ONCE
Status: CANCELLED | OUTPATIENT
Start: 2024-12-12 | End: 2024-12-12

## 2024-12-12 RX ORDER — LIDOCAINE 50 MG/G
OINTMENT TOPICAL ONCE
Status: CANCELLED | OUTPATIENT
Start: 2024-12-12 | End: 2024-12-12

## 2024-12-12 RX ORDER — BACITRACIN ZINC AND POLYMYXIN B SULFATE 500; 1000 [USP'U]/G; [USP'U]/G
OINTMENT TOPICAL ONCE
Status: CANCELLED | OUTPATIENT
Start: 2024-12-12 | End: 2024-12-12

## 2024-12-12 NOTE — PATIENT INSTRUCTIONS
[facility] Wound Care and Hyperbaric Oxygen Therapy   Physician Orders and Discharge Instructions  [address] [place] [address]  [address]  [place] [address]  Telephone: [phone]      FAX [phone]    NAME:  Mark Rothman  YOB: 1950  MEDICAL RECORD NUMBER:  [id number]  DATE:  12/12/2024    Discharge condition: Stable    Discharge to: Home    Left via:Private automobile    Accompanied by:  self    Diabetic Foot Care    Diabetes can lead to many different types of foot complications, including athlete's foot (a fungal infection), calluses, bunions and other foot deformities, or ulcers that can range from a surface wound to a deep infection.  You will need to check your feet twice daily and give them special care and attention.    1) Wash with lukewarm water and a mild soap.  Dry well between your toes. Do not soak your feet unless instructed to do so by a physician.        Moisturize your feet with a good thick cream like Eucerin Cream, Aquaphor or Cocoa Butter (in a jar) at least twice daily. Do not apply moisturizer between toes.    2)  Protect your feet and never go barefoot.  Wear slippers around the house. Treat even minor wounds and skin cracks as an urgent matter when you have diabetes.  Remember early treatment is essential to avoid more advanced problems.    3) Check your feet daily or have someone else check them if your eyesight is limited.  If you live alone try using a handheld mirror.  Watch for a red spot that persists or callus formation.     4)  Look and feel inside your shoes before putting them on.  Inspect the soles for nails or screws.       5)  If you form callus or thickened skin on your feet use Flexitol Heel Balm once daily alternating with your regular moisturizer.  Flexitol Heel [place] is available at [employer] ($12) and other pharmacies.    6) If you form callus this is a sign that this area is getting too much pressure or rubbing in your shoe. You may use a

## 2024-12-12 NOTE — DISCHARGE INSTRUCTIONS
[facility] Wound Care and Hyperbaric Oxygen Therapy   Physician Orders and Discharge Instructions  [address] [place] [address]  [address]  [place] [address]  Telephone: [phone]      FAX [phone]    NAME:  Mark Rothman  YOB: 1950  MEDICAL RECORD NUMBER:  [id number]  DATE:  12/12/2024    Discharge condition: Stable    Discharge to: Home    Left via:Private automobile    Accompanied by:  self    Diabetic Foot Care    Diabetes can lead to many different types of foot complications, including athlete's foot (a fungal infection), calluses, bunions and other foot deformities, or ulcers that can range from a surface wound to a deep infection.  You will need to check your feet twice daily and give them special care and attention.    1) Wash with lukewarm water and a mild soap.  Dry well between your toes. Do not soak your feet unless instructed to do so by a physician.        Moisturize your feet with a good thick cream like Eucerin Cream, Aquaphor or Cocoa Butter (in a jar) at least twice daily. Do not apply moisturizer between toes.    2)  Protect your feet and never go barefoot.  Wear slippers around the house. Treat even minor wounds and skin cracks as an urgent matter when you have diabetes.  Remember early treatment is essential to avoid more advanced problems.    3) Check your feet daily or have someone else check them if your eyesight is limited.  If you live alone try using a handheld mirror.  Watch for a red spot that persists or callus formation.     4)  Look and feel inside your shoes before putting them on.  Inspect the soles for nails or screws.       5)  If you form callus or thickened skin on your feet use Flexitol Heel Balm once daily alternating with your regular moisturizer.  Flexitol Heel [place] is available at [facility] ($12) and other pharmacies.    6) If you form callus this is a sign that this area is getting too much pressure or rubbing in your shoe. You may use a

## 2024-12-12 NOTE — PLAN OF CARE
Problem: Wound:  Goal: Will show signs of wound healing; wound closure and no evidence of infection  Description: Will show signs of wound healing; wound closure and no evidence of infection  Outcome: Progressing     Problem: Weight control:  Goal: Ability to maintain an optimal weight for height and age will be supported  Description: Ability to maintain an optimal weight for height and age will be supported  Outcome: Progressing     Problem: Falls - Risk of:  Goal: Will remain free from falls  Description: Will remain free from falls  Outcome: Progressing     Problem: Blood Glucose:  Goal: Ability to maintain appropriate glucose levels will improve  Description: Ability to maintain appropriate glucose levels will improve  Outcome: Progressing

## 2024-12-12 NOTE — PROGRESS NOTES
[facility]   Progress Note and Procedure Note      Mark Rothman  MEDICAL RECORD NUMBER:  [id number]  AGE: 74 y.o.   GENDER: male  : 1950  EPISODE DATE:  2024    Subjective:     Chief Complaint   Patient presents with    Wound Check     Patient presents today for recheck left great toe.         HISTORY of PRESENT ILLNESS HPI     Mark Rothman is a 74 y.o. male who presents today for wound/ulcer evaluation.   History of Wound Context: left great toe follow up/eval and treat          PAST MEDICAL HISTORY        Diagnosis Date    Acute pain of right hip     Arthritis     Cancer (HCC)     MELANOMA REMOVED FROM LYMPH NODE IN RIGHT JAW    Carotid artery stenosis     Diabetes (HCC)     TYPE 2    Diabetes mellitus (HCC)     Hyperlipidemia     Hypertension     Knee pain     Osteoarthritis     Restless legs syndrome     Thyroid disease        PAST SURGICAL HISTORY    Past Surgical History:   Procedure Laterality Date    BACK SURGERY  2013    LUMBAR/LASER CLEAN UP RUPTURED DISC    CAROTID ENDARTERECTOMY Right 2015    JOINT REPLACEMENT Left     THR    OTHER SURGICAL HISTORY      radiation and neck dissection for melanoma    TOENAIL AVULSION Bilateral     Dr. Nair    TOTAL HIP ARTHROPLASTY Right 2016    HIP TOTAL ARTHROPLASTY ANTERIOR APPROACH performed by Santiago Tristan MD at [facility] OR    TOTAL KNEE ARTHROPLASTY Left 2021    LEFT TOTAL KNEE REPLACEMENT performed by Santiago Tristan MD at [facility] OR       FAMILY HISTORY    Family History   Problem Relation Age of Onset    No Known Problems Mother     No Known Problems Father     Cancer Brother         LUNG CA    Mental Retardation Brother         PARANOID SCHIZOPHRENIA       SOCIAL HISTORY    Social History     Tobacco Use    Smoking status: Never    Smokeless tobacco: Never   Substance Use Topics    Alcohol use: No     Comment: HX OF ETOH ABUSE    Drug use: No       ALLERGIES    Allergies   Allergen Reactions

## 2024-12-19 ENCOUNTER — TELEPHONE (OUTPATIENT)
Age: 74
End: 2024-12-19
Payer: MEDICARE

## 2024-12-19 NOTE — TELEPHONE ENCOUNTER
Patient called to check on his referral for ingrown nail, I told the patient that we had not received the referral yet but will call when it is received.

## 2025-01-20 ENCOUNTER — OFFICE VISIT (OUTPATIENT)
Age: 75
End: 2025-01-20
Payer: MEDICARE

## 2025-01-20 VITALS
WEIGHT: 231 LBS | HEIGHT: 70 IN | HEART RATE: 73 BPM | OXYGEN SATURATION: 96 % | DIASTOLIC BLOOD PRESSURE: 84 MMHG | SYSTOLIC BLOOD PRESSURE: 142 MMHG | BODY MASS INDEX: 33.07 KG/M2

## 2025-01-20 DIAGNOSIS — E11.40 TYPE 2 DIABETES MELLITUS WITH DIABETIC NEUROPATHY, WITHOUT LONG-TERM CURRENT USE OF INSULIN: ICD-10-CM

## 2025-01-20 DIAGNOSIS — M79.676 PAIN AROUND TOENAIL: ICD-10-CM

## 2025-01-20 DIAGNOSIS — L60.0 INGROWN TOENAIL OF RIGHT FOOT: ICD-10-CM

## 2025-01-20 DIAGNOSIS — B35.1 ONYCHOMYCOSIS: Primary | ICD-10-CM

## 2025-01-20 PROBLEM — I10 HYPERTENSION: Status: ACTIVE | Noted: 2019-05-23

## 2025-01-20 PROBLEM — E78.00 HYPERCHOLESTEROLEMIA: Status: ACTIVE | Noted: 2020-08-12

## 2025-01-20 PROBLEM — E11.9 DIABETES MELLITUS: Status: ACTIVE | Noted: 2019-05-23

## 2025-01-20 NOTE — PROGRESS NOTES
[facility] - PODIATRY    Today's Date: 01/20/2025     Patient Name: Eric Maloney  MRN: [id number]  CSN: [id number]  PCP: Michael Enriquez MD  Referring Provider: Michael Enriquez MD    SUBJECTIVE     Chief Complaint   Patient presents with    Establish Care     Michael Enriquez MD-12/19/2024  ingrown right foot big toe  Pt states he is here today because of ingrown on the great toe of right foot. Pt denies pain. Pt wants removal of ingrown.     Diabetes     150 mg/dLbg      HPI: Eric Maloney, a 74 y.o.male, comes to clinic as a(n) new patient complaining of ingrown toenail. Patient has h/o CAD, Type 2 DM, Thyroid Disease, Parotid Neoplasm, Skin CA . Patient is NIDDM with last stated BG level of 150mg/dl. Last A1c 6.4%. Here today with complaints of IGTN to right greater toenail. Denies drainage or other SOI however relays discomfort around the toenail. Relays it has been bothersome on and off for a while now. Previously had a PNA to left greater toenail by Dr. Rasmussen back in October 2024. Notes after the procedure the toe become infected, was red swollen and had purulent drainage. Went to his PCP who prescribed him an oral antibiotics X2. Denies draiange currently however notes the toe is still slightly discolored. Denies pain to the area. Denies open wounds or sores today. Denies numbness or tinging. Denies pain currently however relays right greater toenail has discomfort with application of pressure. Relates previous treatment(s) including PNA . Denies any constitutional symptoms. No other pedal complaints at this time.    Past Medical History:   Diagnosis Date    Carotid artery stenosis     Diabetes mellitus     Disease of thyroid gland     Parotid neoplasm     right    Skin cancer      Past Surgical History:   Procedure Laterality Date    BACK SURGERY      CAROTID ENDARTERECTOMY      NECK SURGERY      carcinoma    TOTAL HIP ARTHROPLASTY Left      Family History   Problem Relation  Age of Onset    Heart disease Other     Hypertension Other     Cancer Other     Diabetes Other      Social History     Socioeconomic History    Marital status:    Tobacco Use    Smoking status: Former     Current packs/day: 0.00     Average packs/day: 2.0 packs/day for 3.0 years (6.0 ttl pk-yrs)     Types: Cigarettes     Start date:      Quit date:      Years since quittin.0     Passive exposure: Past    Smokeless tobacco: Never   Vaping Use    Vaping status: Never Used   Substance and Sexual Activity    Alcohol use: No    Drug use: Defer    Sexual activity: Defer     Allergies   Allergen Reactions    Hydrocodone-Acetaminophen Swelling     Extreme swelling and rash  HIVES      Adhesive Tape Unknown (See Comments)     THE ADHESIVE PART    Lipitor [Atorvastatin]     Cefazolin Rash     Current Outpatient Medications   Medication Sig Dispense Refill    acetaminophen (TYLENOL) 500 MG tablet Take 1 tablet by mouth.      aspirin 81 MG EC tablet Take 1 tablet by mouth Daily.      cetirizine-pseudoephedrine (ZyrTEC-D) 5-120 MG per 12 hr tablet Take 1 tablet by mouth Every 12 (Twelve) Hours As Needed for Allergies.      Cholecalciferol (VITAMIN D-3) 1000 units capsule Take  by mouth.      Coenzyme Q10 (CO Q 10 PO) Take  by mouth.      DiphenhydrAMINE HCl (BENADRYL PO) Take  by mouth.      fenofibrate 160 MG tablet Take 1 tablet by mouth Daily.      glipiZIDE (GLUCOTROL) 5 MG tablet Every 12 (Twelve) Hours.      levothyroxine sodium (TIROSINT) 88 MCG capsule Take 1 capsule by mouth Daily.  4    metFORMIN (GLUCOPHAGE) 500 MG tablet Take 1 tablet by mouth 2 (Two) Times a Day With Meals.      metoprolol tartrate (LOPRESSOR) 50 MG tablet 2 (Two) Times a Day.      MILK THISTLE PO Take  by mouth.      Multiple Vitamins-Minerals (MULTIVITAL PO) Take  by mouth.      Omega-3 Fatty Acids (FISH OIL) 1000 MG capsule capsule Take  by mouth.      pravastatin (PRAVACHOL) 40 MG tablet pravastatin 40 mg tablet   Take 1  tablet every day by oral route.      Saw Palmetto, Serenoa repens, (SAW PALMETTO PO) Take  by mouth.      tiotropium bromide monohydrate (Spiriva Respimat) 2.5 MCG/ACT aerosol solution inhaler Inhale 2 puffs Daily. 2 each 0    Red Yeast Rice Extract (RED YEAST RICE PO) Take  by mouth. (Patient not taking: Reported on 1/20/2025)       No current facility-administered medications for this visit.     Review of Systems   Constitutional:  Negative for activity change and fever.   HENT:  Negative for congestion.    Respiratory:  Negative for chest tightness and shortness of breath.    Cardiovascular:  Negative for chest pain and leg swelling.   Gastrointestinal:  Negative for abdominal pain.   Musculoskeletal:  Positive for arthralgias.   Skin:         Thickened elongated toenails   Neurological:  Positive for numbness. Negative for dizziness and weakness.   Hematological:  Does not bruise/bleed easily.   Psychiatric/Behavioral:  Negative for agitation and behavioral problems.        OBJECTIVE     Vitals:    01/20/25 1016   BP: 142/84   Pulse: 73   SpO2: 96%       PHYSICAL EXAM  GEN:   Accompanied by none.     Foot/Ankle Exam    GENERAL  Diabetic foot exam performed    Appearance:  appears stated age and elderly  Orientation:  AAOx3  Affect:  appropriate  Gait:  unimpaired  Assistance:  independent  Right shoe gear: casual shoe  Left shoe gear: casual shoe    VASCULAR     Right Foot Vascularity   Dorsalis pedis:  2+  Posterior tibial:  2+  Skin temperature:  cool  Edema grading:  None  CFT:  3  Pedal hair growth:  Absent  Varicosities:  mild varicosities     Left Foot Vascularity   Dorsalis pedis:  1+  Posterior tibial:  2+  Skin temperature:  cool  Edema grading:  None  CFT:  3  Pedal hair growth:  Absent  Varicosities:  mild varicosities     NEUROLOGIC     Right Foot Neurologic   Light touch sensation: diminished  Vibratory sensation: diminished  Hot/Cold sensation: diminished  Protective Sensation using  Grant-Jennifer Monofilament:   Sites intact: 1  Sites tested: 10     Left Foot Neurologic   Light touch sensation: absent  Vibratory sensation: absent  Hot/Cold sensation:  absent  Protective Sensation using Grant-Jennifer Monofilament:   Left Foot Sites Intact: 0.  Sites tested: 10    MUSCULOSKELETAL     Right Foot Musculoskeletal   Ecchymosis:  none  Tenderness:  none    Arch:  Normal     Left Foot Musculoskeletal   Ecchymosis:  none  Tenderness:  none  Arch:  Normal    MUSCLE STRENGTH     Right Foot Muscle Strength   Foot dorsiflexion:  4+  Foot plantar flexion:  4+  Foot inversion:  4+  Foot eversion:  4+     Left Foot Muscle Strength   Foot dorsiflexion:  4+  Foot plantar flexion:  4+  Foot inversion:  4+  Foot eversion:  4+    RANGE OF MOTION     Right Foot Range of Motion   Ankle dorsiflexion: decreased      Left Foot Range of Motion   Ankle dorsiflexion: decreased    DERMATOLOGIC      Right Foot Dermatologic   Skin  Positive for corn and dryness.   Nails  1.  Positive for elongated, onychomycosis, abnormal thickness and subungual debris.  2.  Positive for elongated, onychomycosis, abnormal thickness and subungual debris.  3.  Positive for elongated and abnormal thickness.  4.  Positive for elongated and abnormal thickness.  5.  Positive for elongated and abnormal thickness.     Left Foot Dermatologic   Skin  Positive for corn and dryness.   Nails  1.  Positive for elongated, onychomycosis, abnormal thickness, subungual debris and dystrophic nail. Negative for ingrown toenail and paronychia. (PNA to bilateral borders)  2.  Positive for elongated and abnormal thickness.  3.  Positive for elongated and abnormal thickness.  4.  Positive for elongated and abnormally thick.  5.  Positive for elongated and abnormally thick.    Image:       RADIOLOGY/NUCLEAR:  No results found.    LABORATORY/CULTURE RESULTS:      PATHOLOGY RESULTS:       ASSESSMENT/PLAN     Diagnoses and all orders for this visit:    1.  Onychomycosis (Primary)    2. Pain around toenail    3. Ingrown toenail of right foot    4. Type 2 diabetes mellitus with diabetic neuropathy, without long-term current use of insulin      Comprehensive lower extremity examination and evaluation was performed.  Discussed findings and treatment plan including risks, benefits, and treatment options with patient in detail. Patient agreed with treatment plan.  After verbal consent obtained, nail(s) x10 debrided of length and thickness with nail nipper without incidence  After verbal consent obtained, nail(s) x1 debrided of offending borders with nail nipper without incidence  After verbal consent obtained, calluses x4 pared utilizing dermal curette and/or scalpel without incidence  Patient may maintain nails and calluses at home utilizing emery board or pumice stone between visits as needed  Reviewed at home diabetic foot care including daily foot checks   Continue control management of type II DM per PCP.  For ingrown toenail to right greater toe-no active SOI present today. Discussed with patient several different treatment options for IGTN including keeping the nail trimmed at an appropriate length and maintaining at that length VS total/partial nail avulsion with or without Matrixectomy. Discussed that anytime an avulsion is performed it does have potential to cause damage to the matrix. Damage to the matrix can result in the nail to return thickened or irregular. Also discussed that use of phenol for matrixectomy has ~5% chance of nail regrowth, despite application. Patient prefers to remain conservative at this point in time.  Patient interested in routine care.  Scheduled him to return in 3 months.  Encouraged to call sooner with any questions or concerns.    An After Visit Summary was printed and given to the patient at discharge, including (if requested) any available informative/educational handouts regarding diagnosis, treatment, or medications. All questions  were answered to patient/family satisfaction. Should symptoms fail to improve or worsen they agree to call or return to clinic or to go to the Emergency Department. Discussed the importance of following up with any needed screening tests/labs/specialist appointments and any requested follow-up recommended by me today. Importance of maintaining follow-up discussed and patient accepts that missed appointments can delay diagnosis and potentially lead to worsening of conditions.  Return in about 3 months (around 4/20/2025) for Follow-up with APRN, Follow-up in [facility] or sooner if acute issues arise.      This document has been electronically signed by JUAN MANUEL Ureña on January 20, 2025 13:10 CST

## 2025-01-28 ENCOUNTER — TELEPHONE (OUTPATIENT)
Age: 75
End: 2025-01-28

## 2025-04-04 NOTE — PROGRESS NOTES
[facility]  [facility]  [address]  [facility] [id number] [phone]     Patient: Mark Rothman   YOB: 1950   Date: 4/7/2025     No chief complaint on file.       History of Present Illness  Mark is a right hand dominant 75 y.o. male who presents today with reports of right wrist pain in the a cyst to the dorsal aspect of his wrist that has gradually worsened over the course of approximately a couple months.  Of note, patient has previously been seen by Dr. Brad Nair in relation to foot conditions.  Patient states that the pain is tolerable, that he was concerned about the cyst to the dorsal aspect of his wrist.       Past Medical History:   Diagnosis Date    Acute pain of right hip     Arthritis     Cancer (HCC) 1998    MELANOMA REMOVED FROM LYMPH NODE IN RIGHT JAW    Carotid artery stenosis     Diabetes (HCC)     TYPE 2    Diabetes mellitus (HCC)     Hyperlipidemia     Hypertension     Knee pain     Osteoarthritis     Restless legs syndrome     Thyroid disease       Past Surgical History:   Procedure Laterality Date    BACK SURGERY  2013    LUMBAR/LASER CLEAN UP RUPTURED DISC    CAROTID ENDARTERECTOMY Right 08/05/2015    JOINT REPLACEMENT Left 2010    THR    OTHER SURGICAL HISTORY      radiation and neck dissection for melanoma    TOENAIL AVULSION Bilateral     Dr. Nair    TOTAL HIP ARTHROPLASTY Right 08/22/2016    HIP TOTAL ARTHROPLASTY ANTERIOR APPROACH performed by Santiago Tristan MD at [facility] OR    TOTAL KNEE ARTHROPLASTY Left 06/21/2021    LEFT TOTAL KNEE REPLACEMENT performed by Santiago Tristan MD at [facility] OR      Social History     Socioeconomic History    Marital status:    Tobacco Use    Smoking status: Never    Smokeless tobacco: Never   Substance and Sexual Activity    Alcohol use: No     Comment: HX OF ETOH ABUSE    Drug use: No     Social Drivers of Health      Received from [facility]

## 2025-04-07 ENCOUNTER — OFFICE VISIT (OUTPATIENT)
Age: 75
End: 2025-04-07
Payer: MEDICARE

## 2025-04-07 VITALS — WEIGHT: 219 LBS | HEIGHT: 71 IN | BODY MASS INDEX: 30.66 KG/M2

## 2025-04-07 DIAGNOSIS — R22.31 MASS OF RIGHT WRIST: ICD-10-CM

## 2025-04-07 DIAGNOSIS — M25.531 RIGHT WRIST PAIN: Primary | ICD-10-CM

## 2025-04-07 PROCEDURE — G8427 DOCREV CUR MEDS BY ELIG CLIN: HCPCS | Performed by: NURSE PRACTITIONER

## 2025-04-07 PROCEDURE — 1036F TOBACCO NON-USER: CPT | Performed by: NURSE PRACTITIONER

## 2025-04-07 PROCEDURE — G8417 CALC BMI ABV UP PARAM F/U: HCPCS | Performed by: NURSE PRACTITIONER

## 2025-04-07 PROCEDURE — 1159F MED LIST DOCD IN RCRD: CPT | Performed by: NURSE PRACTITIONER

## 2025-04-07 PROCEDURE — 1123F ACP DISCUSS/DSCN MKR DOCD: CPT | Performed by: NURSE PRACTITIONER

## 2025-04-07 PROCEDURE — 99203 OFFICE O/P NEW LOW 30 MIN: CPT | Performed by: NURSE PRACTITIONER

## 2025-04-07 RX ORDER — ACETIC ACID 20.65 MG/ML
SOLUTION AURICULAR (OTIC)
COMMUNITY

## 2025-04-07 RX ORDER — CETIRIZINE HYDROCHLORIDE, PSEUDOEPHEDRINE HYDROCHLORIDE 5; 120 MG/1; MG/1
1 TABLET, FILM COATED, EXTENDED RELEASE ORAL EVERY 12 HOURS PRN
COMMUNITY
Start: 2025-03-10

## 2025-04-07 RX ORDER — GLIPIZIDE 10 MG/1
10 TABLET ORAL DAILY
COMMUNITY
Start: 2025-02-06

## 2025-04-21 ENCOUNTER — TELEPHONE (OUTPATIENT)
Age: 75
End: 2025-04-21
Payer: MEDICARE

## 2025-04-21 ENCOUNTER — TELEPHONE (OUTPATIENT)
Age: 75
End: 2025-04-21

## 2025-04-21 NOTE — TELEPHONE ENCOUNTER
LVM for pt to contact us to reschedule appt due to provider out sick. HUB is able to relay message.

## 2025-04-21 NOTE — TELEPHONE ENCOUNTER
Attempted to reschedule appt with Inez Krueger due to illness.  No answer, lm for return call to reschedule.  HUB TO RELAY.

## 2025-04-21 NOTE — PROGRESS NOTES
[facility] - PODIATRY    Today's Date: 04/23/2025     Patient Name: Eric Maloney  MRN: [id number]  CSN: [id number]  PCP: Michael Enriquez MD  Referring Provider: No ref. provider found    SUBJECTIVE     Chief Complaint   Patient presents with    Follow-up     PCP: Michael Enriquez MD 04/07/25  3 months (around 4/20/2025) for Follow-up with APRN, [facility] in [facility].   Pt states he is here today for diabetic foot/nail care. Pt denies pain.     Diabetes     150 mg/dLbg      HPI: Eric Maloney, a 75 y.o.male, comes to clinic as a(n) new patient complaining of ingrown toenail. Patient has h/o CAD, Type 2 DM, Thyroid Disease, Parotid Neoplasm, Skin CA . Patient is NIDDM with last stated BG level of 150mg/dl.  Unsure of last A1c.  Toenails are in need of care today due to length, thickness, and irregularity.  Relates he has previously tried topical antifungal nail treatments which helped clear up discoloration.  He is interested in restarting this treatment today.  Callusing has returned and is occasionally uncomfortable.  Relays occasional discomfort with application of pressure around toenails however he is currently denying pain.  Denies numbness or tingling.  Denies open wounds or sores.  Relates previous treatment(s) including PNA . Denies any constitutional symptoms. No other pedal complaints at this time.    Past Medical History:   Diagnosis Date    Carotid artery stenosis     Diabetes mellitus     Disease of thyroid gland     Parotid neoplasm     right    Skin cancer      Past Surgical History:   Procedure Laterality Date    BACK SURGERY      CAROTID ENDARTERECTOMY      NECK SURGERY      carcinoma    TOTAL HIP ARTHROPLASTY Left      Family History   Problem Relation Age of Onset    Heart disease Other     Hypertension Other     Cancer Other     Diabetes Other      Social History     Socioeconomic History    Marital status:    Tobacco Use    Smoking status: Former      Current packs/day: 0.00     Average packs/day: 2.0 packs/day for 3.0 years (6.0 ttl pk-yrs)     Types: Cigarettes     Start date:      Quit date: 1970     Years since quittin.3     Passive exposure: Past    Smokeless tobacco: Never   Vaping Use    Vaping status: Never Used   Substance and Sexual Activity    Alcohol use: Yes     Comment: very seldom    Drug use: Never    Sexual activity: Defer     Allergies   Allergen Reactions    Hydrocodone-Acetaminophen Swelling     Extreme swelling and rash  HIVES      Adhesive Tape Unknown (See Comments)     THE ADHESIVE PART    Lipitor [Atorvastatin]     Cefazolin Rash     Current Outpatient Medications   Medication Sig Dispense Refill    acetaminophen (TYLENOL) 500 MG tablet Take 1 tablet by mouth.      aspirin 81 MG EC tablet Take 1 tablet by mouth Daily.      cetirizine-pseudoephedrine (ZyrTEC-D) 5-120 MG per 12 hr tablet Take 1 tablet by mouth Every 12 (Twelve) Hours As Needed for Allergies.      Cholecalciferol (VITAMIN D-3) 1000 units capsule Take  by mouth.      Coenzyme Q10 (CO Q 10 PO) Take  by mouth.      DiphenhydrAMINE HCl (BENADRYL PO) Take  by mouth.      fenofibrate 160 MG tablet Take 1 tablet by mouth Daily.      glipiZIDE (GLUCOTROL) 5 MG tablet Every 12 (Twelve) Hours.      levothyroxine sodium (TIROSINT) 88 MCG capsule Take 1 capsule by mouth Daily.  4    metFORMIN (GLUCOPHAGE) 500 MG tablet Take 1 tablet by mouth 2 (Two) Times a Day With Meals.      metoprolol tartrate (LOPRESSOR) 50 MG tablet 2 (Two) Times a Day.      MILK THISTLE PO Take  by mouth.      Multiple Vitamins-Minerals (MULTIVITAL PO) Take  by mouth.      Omega-3 Fatty Acids (FISH OIL) 1000 MG capsule capsule Take  by mouth.      pravastatin (PRAVACHOL) 40 MG tablet pravastatin 40 mg tablet   Take 1 tablet every day by oral route.      Red Yeast Rice Extract (RED YEAST RICE PO) Take  by mouth.      Saw Palmetto, Serenoa repens, (SAW PALMETTO PO) Take  by mouth.      tiotropium bromide  monohydrate (Spiriva Respimat) 2.5 MCG/ACT aerosol solution inhaler Inhale 2 puffs Daily. 2 each 0    ciclopirox (PENLAC) 8 % solution Apply  topically to the appropriate area as directed Every Night for 336 days. Apply as directed to affected toenails. 6 mL 5     No current facility-administered medications for this visit.     Review of Systems   Constitutional:  Negative for activity change and fever.   HENT:  Negative for congestion.    Respiratory:  Negative for chest tightness and shortness of breath.    Cardiovascular:  Negative for chest pain and leg swelling.   Gastrointestinal:  Negative for abdominal pain.   Musculoskeletal:  Positive for arthralgias.   Skin:         Thickened elongated toenails   Neurological:  Positive for numbness. Negative for dizziness and weakness.   Hematological:  Does not bruise/bleed easily.   Psychiatric/Behavioral:  Negative for agitation and behavioral problems.        OBJECTIVE     Vitals:    04/23/25 1002   BP: 160/96   Pulse: 70   SpO2: 93%         PHYSICAL EXAM  GEN:   Accompanied by none.     Foot/Ankle Exam    GENERAL  Diabetic foot exam performed    Appearance:  appears stated age and elderly  Orientation:  AAOx3  Affect:  appropriate  Gait:  unimpaired  Assistance:  independent  Right shoe gear: casual shoe  Left shoe gear: casual shoe    VASCULAR     Right Foot Vascularity   Dorsalis pedis:  2+  Posterior tibial:  2+  Skin temperature:  cool  Edema grading:  None  CFT:  3  Pedal hair growth:  Absent  Varicosities:  mild varicosities     Left Foot Vascularity   Dorsalis pedis:  1+  Posterior tibial:  2+  Skin temperature:  cool  Edema grading:  None  CFT:  3  Pedal hair growth:  Absent  Varicosities:  mild varicosities     NEUROLOGIC     Right Foot Neurologic   Light touch sensation: diminished  Vibratory sensation: diminished  Hot/Cold sensation: diminished  Protective Sensation using [place]-Jennifer Monofilament:   Sites intact: 1  Sites tested: 10     Left Foot  Neurologic   Light touch sensation: absent  Vibratory sensation: absent  Hot/Cold sensation:  absent  Protective Sensation using [place]-Jennifer Monofilament:   Left Foot Sites Intact: 0.  Sites tested: 10    MUSCULOSKELETAL     Right Foot Musculoskeletal   Ecchymosis:  none  Tenderness:  none    Arch:  Normal     Left Foot Musculoskeletal   Ecchymosis:  none  Tenderness:  none  Arch:  Normal    MUSCLE STRENGTH     Right Foot Muscle Strength   Foot dorsiflexion:  4+  Foot plantar flexion:  4+  Foot inversion:  4+  Foot eversion:  4+     Left Foot Muscle Strength   Foot dorsiflexion:  4+  Foot plantar flexion:  4+  Foot inversion:  4+  Foot eversion:  4+    RANGE OF MOTION     Right Foot Range of Motion   Ankle dorsiflexion: decreased      Left Foot Range of Motion   Ankle dorsiflexion: decreased    DERMATOLOGIC      Right Foot Dermatologic   Skin  Positive for corn and dryness.   Nails  1.  Positive for elongated, onychomycosis, abnormal thickness and subungual debris.  2.  Positive for elongated, onychomycosis, abnormal thickness and subungual debris.  3.  Positive for elongated and abnormal thickness.  4.  Positive for elongated and abnormal thickness.  5.  Positive for elongated and abnormal thickness.     Left Foot Dermatologic   Skin  Positive for corn and dryness.   Nails  1.  Positive for elongated, onychomycosis, abnormal thickness, subungual debris and dystrophic nail. (PNA to bilateral borders)  2.  Positive for elongated and abnormal thickness.  3.  Positive for elongated and abnormal thickness.  4.  Positive for elongated and abnormally thick.  5.  Positive for elongated and abnormally thick.    Image:       RADIOLOGY/NUCLEAR:  XR Wrist 3+ View Right  Result Date: 4/7/2025  Narrative: PA, lateral, and oblique x-rays obtained today of the right wrist.  Per my interpretation, no acute fractures or dislocations appreciable.  There is an area of old fracture with good callus formation to the distal radius.    Diffuse arthritic changes noted throughout the wrist and hand.  Arthritic changes noted worse at the radiocarpal joint.      LABORATORY/CULTURE RESULTS:      PATHOLOGY RESULTS:       ASSESSMENT/PLAN     Diagnoses and all orders for this visit:    1. Onychomycosis (Primary)  -     ciclopirox (PENLAC) 8 % solution; Apply  topically to the appropriate area as directed Every Night for 336 days. Apply as directed to affected toenails.  Dispense: 6 mL; Refill: 5    2. Pain around toenail    3. Type 2 diabetes mellitus with diabetic neuropathy, without long-term current use of insulin    4. Encounter for diabetic foot exam    5. Elevated blood pressure reading with diagnosis of hypertension        Comprehensive lower extremity examination and evaluation was performed.  Discussed findings and treatment plan including risks, benefits, and treatment options with patient in detail. Patient agreed with treatment plan.  After verbal consent obtained, nail(s) x10 debrided of length and thickness with nail nipper without incidence  After verbal consent obtained, calluses x4 pared utilizing dermal curette and/or scalpel without incidence  Patient may maintain nails and calluses at home utilizing emery board or pumice stone between visits as needed  Reviewed at home diabetic foot care including daily foot checks   DFE performed today.  No recent A1c for review  Continue control management of type II DM per PCP.  For onychomycosis, Rx for ciclopirox today.  Instructed on use, including 1 day a week cleaning the nails thoroughly with alcohol and a disposable emery board.  Patient verbalized understanding.  Encouraged to keep areas of recurrent callusing moisturized and to utilize pumice stone or foot file to keep areas pared of thickness.  Scheduled him to return in 3 months.  Encouraged to call sooner with any questions or concerns.    Patient did have an elevated BP reading in office today.  He denies missing any HTN medications  recently.  He denies chest pain/discomfort/tightness, shortness of breath, nausea, arm pain, lightheadedness/dizziness, etc.  Reports that he does have a manage to check his BP at home-would like to do this after his visit today instead of having BP rechecked in office.  Advised patient to seek care at nearest ER if he did become symptomatic. Patient verbalizes understanding.       An After Visit Summary was printed and given to the patient at discharge, including (if requested) any available informative/educational handouts regarding diagnosis, treatment, or medications. All questions were answered to patient/family satisfaction. Should symptoms fail to improve or worsen they agree to call or return to clinic or to go to the Emergency Department. Discussed the importance of following up with any needed screening tests/labs/specialist appointments and any requested follow-up recommended by me today. Importance of maintaining follow-up discussed and patient accepts that missed appointments can delay diagnosis and potentially lead to worsening of conditions.  Return in about 3 months (around 7/23/2025) for Follow-up with JUAN MANUEL, Follow-up in [facility] or sooner if acute issues arise.      This document has been electronically signed by JUAN MANUEL Ureña on April 23, 2025 11:30 CDT

## 2025-04-23 ENCOUNTER — OFFICE VISIT (OUTPATIENT)
Age: 75
End: 2025-04-23
Payer: MEDICARE

## 2025-04-23 VITALS
OXYGEN SATURATION: 93 % | WEIGHT: 117 LBS | HEIGHT: 70 IN | SYSTOLIC BLOOD PRESSURE: 160 MMHG | BODY MASS INDEX: 16.75 KG/M2 | HEART RATE: 70 BPM | DIASTOLIC BLOOD PRESSURE: 96 MMHG

## 2025-04-23 DIAGNOSIS — M79.676 PAIN AROUND TOENAIL: ICD-10-CM

## 2025-04-23 DIAGNOSIS — I10 ELEVATED BLOOD PRESSURE READING WITH DIAGNOSIS OF HYPERTENSION: ICD-10-CM

## 2025-04-23 DIAGNOSIS — B35.1 ONYCHOMYCOSIS: Primary | ICD-10-CM

## 2025-04-23 DIAGNOSIS — E11.9 ENCOUNTER FOR DIABETIC FOOT EXAM: ICD-10-CM

## 2025-04-23 DIAGNOSIS — E11.40 TYPE 2 DIABETES MELLITUS WITH DIABETIC NEUROPATHY, WITHOUT LONG-TERM CURRENT USE OF INSULIN: ICD-10-CM

## 2025-04-23 RX ORDER — CICLOPIROX 80 MG/ML
SOLUTION TOPICAL NIGHTLY
Qty: 6 ML | Refills: 5 | Status: SHIPPED | OUTPATIENT
Start: 2025-04-23 | End: 2026-03-25

## 2025-07-02 ENCOUNTER — OFFICE VISIT (OUTPATIENT)
Age: 75
End: 2025-07-02

## 2025-07-02 VITALS
WEIGHT: 217 LBS | RESPIRATION RATE: 18 BRPM | SYSTOLIC BLOOD PRESSURE: 136 MMHG | BODY MASS INDEX: 30.27 KG/M2 | OXYGEN SATURATION: 96 % | HEART RATE: 66 BPM | DIASTOLIC BLOOD PRESSURE: 80 MMHG | TEMPERATURE: 98 F

## 2025-07-02 DIAGNOSIS — L91.8 ST (SKIN TAG): Primary | ICD-10-CM

## 2025-07-02 NOTE — PROGRESS NOTES
Mark Rothman (:  1950) is a 75 y.o. male,New patient, here for evaluation of the following chief complaint(s):  Other (Skin tag on left side of back. )      Assessment & Plan :  Visit Diagnoses and Associated Orders         ST (skin tag)    -  Primary             Reassured  - not inflammed or irritated - no need for abx  Continue to watch for change in appearance     Follow up with PCP as needed       Subjective :  HPI     75 y.o. male presents with a lesion in his back. He states his wife wanted him to get it checked out. No pain . No itching..   BP is slightly elevated today       Vitals:    25 1339 25 1345   BP: (!) 146/82 136/80   Pulse: 66    Resp: 18    Temp: 98 °F (36.7 °C)    TempSrc: Temporal    SpO2: 96%    Weight: 98.4 kg (217 lb)        No results found for this visit on 25.      Objective   Physical Exam  Vitals and nursing note reviewed.   Constitutional:       General: He is not in acute distress.     Appearance: Normal appearance. He is not ill-appearing.   HENT:      Head: Normocephalic.   Cardiovascular:      Rate and Rhythm: Normal rate and regular rhythm.      Heart sounds: Normal heart sounds. No murmur heard.  Pulmonary:      Effort: Pulmonary effort is normal.      Breath sounds: Normal breath sounds.   Abdominal:      Tenderness: There is no abdominal tenderness.   Skin:     Findings: Lesion (wide based skin tag in the mid back  - does not look irritated or inflammed) present.   Neurological:      General: No focal deficit present.      Mental Status: He is alert and oriented to person, place, and time.   Psychiatric:         Behavior: Behavior normal.               An electronic signature was used to authenticate this note.    Aneg Ramirez MD

## 2025-07-15 NOTE — PROGRESS NOTES
[facility] - PODIAT[facility]    Today's Date: 07/23/2025     Patient Name: Eric Maloney  MRN: [id number]  CSN: [id number]  PCP: Michael Enriquez MD  Referring Provider: No ref. provider found    SUBJECTIVE     Chief Complaint   Patient presents with   • Follow-up     PCP: Michael Enriquez MD 04/07/25  Return in about 3 months (around 7/23/2025) for Follow-up with APRN, Follow-up in [facility].   Pt states he is here today for diabetic foot/nail care. Pt denies pain.    • Diabetes     117 mg/dLbg      HPI: Eric Maloney, a 75 y.o.male, comes to clinic as a(n) new patient complaining of ingrown toenail. Patient has h/o CAD, Type 2 DM, Thyroid Disease, Parotid Neoplasm, Skin CA. Patient is NIDDM with last stated BG level of 150mg/dl.  Unsure of last A1c.  Toenails are in need of care today due to length, thickness, and irregularity.  Has continued with previous Penlac and is cleaning nails weekly with alcohol and nail polish previously instructed.  Has not noticed much difference since starting use.  Relays occasional discomfort with application of pressure to toenails.  Denies drainage.  Denies numbness or tingling.  Denies open wounds or sores.  Relates previous treatment(s) including PNA. Denies any constitutional symptoms. No other pedal complaints at this time.    Past Medical History:   Diagnosis Date   • Carotid artery stenosis    • Diabetes mellitus    • Disease of thyroid gland    • Parotid neoplasm     right   • Skin cancer      Past Surgical History:   Procedure Laterality Date   • BACK SURGERY     • CAROTID ENDARTERECTOMY     • NECK SURGERY      carcinoma   • TOTAL HIP ARTHROPLASTY Left      Family History   Problem Relation Age of Onset   • Heart disease Other    • Hypertension Other    • Cancer Other    • Diabetes Other      Social History     Socioeconomic History   • Marital status:    Tobacco Use   • Smoking status: Former     Current packs/day: 0.00     Average  packs/day: 2.0 packs/day for 3.0 years (6.0 ttl pk-yrs)     Types: Cigarettes     Start date:      Quit date: 1970     Years since quittin.5     Passive exposure: Past   • Smokeless tobacco: Never   Vaping Use   • Vaping status: Never Used   Substance and Sexual Activity   • Alcohol use: Yes     Comment: very seldom   • Drug use: Never   • Sexual activity: Defer     Allergies   Allergen Reactions   • Hydrocodone-Acetaminophen Swelling     Extreme swelling and rash  HIVES     • Adhesive Tape Unknown (See Comments)     THE ADHESIVE PART   • Lipitor [Atorvastatin]    • Cefazolin Rash     Current Outpatient Medications   Medication Sig Dispense Refill   • acetaminophen (TYLENOL) 500 MG tablet Take 1 tablet by mouth.     • aspirin 81 MG EC tablet Take 1 tablet by mouth Daily.     • cetirizine-pseudoephedrine (ZyrTEC-D) 5-120 MG per 12 hr tablet Take 1 tablet by mouth Every 12 (Twelve) Hours As Needed for Allergies.     • Cholecalciferol (VITAMIN D-3) 1000 units capsule Take  by mouth.     • ciclopirox (PENLAC) 8 % solution Apply  topically to the appropriate area as directed Every Night for 336 days. Apply as directed to affected toenails. 6 mL 5   • Coenzyme Q10 (CO Q 10 PO) Take  by mouth.     • DiphenhydrAMINE HCl (BENADRYL PO) Take  by mouth.     • fenofibrate 160 MG tablet Take 1 tablet by mouth Daily.     • glipiZIDE (GLUCOTROL) 5 MG tablet Every 12 (Twelve) Hours.     • levothyroxine sodium (TIROSINT) 88 MCG capsule Take 1 capsule by mouth Daily.  4   • metFORMIN (GLUCOPHAGE) 500 MG tablet Take 1 tablet by mouth 2 (Two) Times a Day With Meals.     • metoprolol tartrate (LOPRESSOR) 50 MG tablet 2 (Two) Times a Day.     • MILK THISTLE PO Take  by mouth.     • Multiple Vitamins-Minerals (MULTIVITAL PO) Take  by mouth.     • Omega-3 Fatty Acids (FISH OIL) 1000 MG capsule capsule Take  by mouth.     • pravastatin (PRAVACHOL) 40 MG tablet pravastatin 40 mg tablet   Take 1 tablet every day by oral route.     •  Red Yeast Rice Extract (RED YEAST RICE PO) Take  by mouth.     • Saw Palmetto, Serenoa repens, (SAW PALMETTO PO) Take  by mouth.     • tiotropium bromide monohydrate (Spiriva Respimat) 2.5 MCG/ACT aerosol solution inhaler Inhale 2 puffs Daily. 2 each 0     No current facility-administered medications for this visit.     Review of Systems   Constitutional:  Negative for activity change and fever.   HENT:  Negative for congestion.    Respiratory:  Negative for chest tightness and shortness of breath.    Cardiovascular:  Negative for chest pain and leg swelling.   Gastrointestinal:  Negative for abdominal pain.   Musculoskeletal:  Positive for arthralgias.   Skin:         Thickened elongated toenails   Neurological:  Positive for numbness. Negative for dizziness and weakness.   Hematological:  Does not bruise/bleed easily.   Psychiatric/Behavioral:  Negative for agitation and behavioral problems.        OBJECTIVE     Vitals:    07/23/25 1058   BP: 140/84   Pulse: 61   SpO2: 98%           PHYSICAL EXAM  GEN:   Accompanied by none.     Foot/Ankle Exam    GENERAL  Diabetic foot exam performed    Appearance:  appears stated age and elderly  Orientation:  AAOx3  Affect:  appropriate  Gait:  unimpaired  Assistance:  independent  Right shoe gear: casual shoe  Left shoe gear: casual shoe    VASCULAR     Right Foot Vascularity   Dorsalis pedis:  2+  Posterior tibial:  2+  Skin temperature:  cool  Edema grading:  None  CFT:  3  Pedal hair growth:  Absent  Varicosities:  mild varicosities     Left Foot Vascularity   Dorsalis pedis:  1+  Posterior tibial:  2+  Skin temperature:  cool  Edema grading:  None  CFT:  3  Pedal hair growth:  Absent  Varicosities:  mild varicosities     NEUROLOGIC     Right Foot Neurologic   Light touch sensation: diminished  Vibratory sensation: diminished  Hot/Cold sensation: diminished  Protective Sensation using [place]-Jennifer Monofilament:   Sites intact: 1  Sites tested: 10     Left Foot  Neurologic   Light touch sensation: absent  Vibratory sensation: absent  Hot/Cold sensation:  absent  Protective Sensation using [place]-Jennifer Monofilament:   Left Foot Sites Intact: 0.  Sites tested: 10    MUSCULOSKELETAL     Right Foot Musculoskeletal   Ecchymosis:  none  Tenderness:  none    Arch:  Normal     Left Foot Musculoskeletal   Ecchymosis:  none  Tenderness:  none  Arch:  Normal    MUSCLE STRENGTH     Right Foot Muscle Strength   Foot dorsiflexion:  4+  Foot plantar flexion:  4+  Foot inversion:  4+  Foot eversion:  4+     Left Foot Muscle Strength   Foot dorsiflexion:  4+  Foot plantar flexion:  4+  Foot inversion:  4+  Foot eversion:  4+    RANGE OF MOTION     Right Foot Range of Motion   Ankle dorsiflexion: decreased      Left Foot Range of Motion   Ankle dorsiflexion: decreased    DERMATOLOGIC      Right Foot Dermatologic   Skin  Positive for corn and dryness.   Nails  1.  Positive for elongated, onychomycosis, abnormal thickness and subungual debris.  2.  Positive for elongated, onychomycosis, abnormal thickness and subungual debris.  3.  Positive for elongated and abnormal thickness.  4.  Positive for elongated and abnormal thickness.  5.  Positive for elongated and abnormal thickness.     Left Foot Dermatologic   Skin  Positive for corn and dryness.   Nails  1.  Positive for elongated, onychomycosis, abnormal thickness, subungual debris and dystrophic nail. (PNA to bilateral borders)  2.  Positive for elongated and abnormal thickness.  3.  Positive for elongated and abnormal thickness.  4.  Positive for elongated and abnormally thick.  5.  Positive for elongated and abnormally thick.     Left foot additional comments: Mild changes noted to greater toe new nail outgrowth    Image:       RADIOLOGY/NUCLEAR:  No results found.      LABORATORY/CULTURE RESULTS:      PATHOLOGY RESULTS:       ASSESSMENT/PLAN     Diagnoses and all orders for this visit:    1. Onychomycosis (Primary)    2. Pain around  toenail    3. Type 2 diabetes mellitus with diabetic neuropathy, without long-term current use of insulin    4. Encounter for diabetic foot exam          Comprehensive lower extremity examination and evaluation was performed.  Discussed findings and treatment plan including risks, benefits, and treatment options with patient in detail. Patient agreed with treatment plan.  After verbal consent obtained, nail(s) x10 debrided of length and thickness with nail nipper without incidence  Patient may maintain nails and calluses at home utilizing emery board or pumice stone between visits as needed  Reviewed at home diabetic foot care including daily foot checks   No recent A1c for review  Continue control management of type II DM per PCP.  For onychomycosis, patient to continue with previously prescribed Penlac.  Continue cleaning the nails thoroughly with alcohol and a disposable emery board weekly.    Encouraged to keep areas of recurrent callusing moisturized and to utilize pumice stone or foot file to keep areas pared of thickness.  Patient prefers to return in 6 months.  Encouraged to call sooner with any questions or concerns.    An After Visit Summary was printed and given to the patient at discharge, including (if requested) any available informative/educational handouts regarding diagnosis, treatment, or medications. All questions were answered to patient/family satisfaction. Should symptoms fail to improve or worsen they agree to call or return to clinic or to go to the Emergency Department. Discussed the importance of following up with any needed screening tests/labs/specialist appointments and any requested follow-up recommended by me today. Importance of maintaining follow-up discussed and patient accepts that missed appointments can delay diagnosis and potentially lead to worsening of conditions.  Return in about 6 months (around 1/23/2026) for Follow-up with APRN, Follow-up in [facility]., or sooner if  acute issues arise.    I spent 10 minutes caring for Eric on this date of service. This time includes time spent by me in the following activities: preparing for the visit, performing a medically appropriate examination and/or evaluation, counseling and educating the patient/family/caregiver, and documenting information in the medical record  I spent 5 minutes on the separately reported service of toenail debridement. This time is not included in the time used to support the E/M service also reported today.      This document has been electronically signed by JUAN MANUEL Ureña on July 23, 2025 12:10 CDT

## 2025-07-23 ENCOUNTER — OFFICE VISIT (OUTPATIENT)
Age: 75
End: 2025-07-23
Payer: MEDICARE

## 2025-07-23 VITALS
HEART RATE: 61 BPM | DIASTOLIC BLOOD PRESSURE: 84 MMHG | WEIGHT: 217 LBS | SYSTOLIC BLOOD PRESSURE: 140 MMHG | OXYGEN SATURATION: 98 % | BODY MASS INDEX: 31.07 KG/M2 | HEIGHT: 70 IN

## 2025-07-23 DIAGNOSIS — B35.1 ONYCHOMYCOSIS: Primary | ICD-10-CM

## 2025-07-23 DIAGNOSIS — E11.9 ENCOUNTER FOR DIABETIC FOOT EXAM: ICD-10-CM

## 2025-07-23 DIAGNOSIS — E11.40 TYPE 2 DIABETES MELLITUS WITH DIABETIC NEUROPATHY, WITHOUT LONG-TERM CURRENT USE OF INSULIN: ICD-10-CM

## 2025-07-23 DIAGNOSIS — M79.676 PAIN AROUND TOENAIL: ICD-10-CM

## (undated) DEVICE — Device: Brand: POWER-FLO®

## (undated) DEVICE — ZIMMER® STERILE DISPOSABLE TOURNIQUET CUFF WITH PLC, DUAL PORT, SINGLE BLADDER, 34 IN. (86 CM)

## (undated) DEVICE — SUTURE VCRL SZ 2-0 L27IN ABSRB UD L26MM SH 1/2 CIR J417H

## (undated) DEVICE — SURGICAL PROCEDURE PACK KNEE TOT DBD CDS LOURDES HOSP LF

## (undated) DEVICE — SUTURE VCRL SZ 1 L18IN ABSRB UD L36MM CT-1 1/2 CIR J841D

## (undated) DEVICE — TRAY EPI 25GA L3.5IN 0.75% BIPIVCAIN 8.25% D CONTAIN BPA

## (undated) DEVICE — CHLORAPREP 26ML ORANGE

## (undated) DEVICE — NON-WOVEN ADHESIVE WOUND DRESSING: Brand: PRIMAPORE ADHESIVE DRESSING 30*10CM

## (undated) DEVICE — GLOVE SURG SZ 85 L12IN FNGR THK79MIL GRN LTX FREE

## (undated) DEVICE — GLOVE SURG SZ 85 L12IN FNGR ORTHO 126MIL CRM LTX FREE

## (undated) DEVICE — FAN SPRAY KIT: Brand: PULSAVAC®

## (undated) DEVICE — 3M™ STERI-DRAPE™ INSTRUMENT POUCH 1018: Brand: STERI-DRAPE™

## (undated) DEVICE — Z INACTIVE USE 2660664 SOLUTION IRRIG 3000ML 0.9% SOD CHL USP UROMATIC PLAS CONT

## (undated) DEVICE — PAD,ARMBOARD,CONV,FOAM,2X8X20",12PR/CS: Brand: MEDLINE

## (undated) DEVICE — 3M™ IOBAN™ 2 ANTIMICROBIAL INCISE DRAPE 6650EZ: Brand: IOBAN™ 2

## (undated) DEVICE — STERILE POLYISOPRENE POWDER-FREE SURGICAL GLOVES: Brand: PROTEXIS

## (undated) DEVICE — BLADE RMR L51MM PAT PILOT H

## (undated) DEVICE — SYSTEM SKIN CLSR 22CM DERMBND PRINEO

## (undated) DEVICE — GOWN,PREVENTION PLUS,XL,ST,24/CS: Brand: MEDLINE

## (undated) DEVICE — UNDERGLOVE SURG SZ 8 FNGR THK0.21MIL GRN LTX BEAD CUF

## (undated) DEVICE — GLOVE SURG SZ 85 CRM LTX FREE POLYISOPRENE POLYMER BEAD ANTI

## (undated) DEVICE — BLADE SAW W12.5XL70MM THK1MM RECIP DBL SIDE OFFSET

## (undated) DEVICE — SUTURE VCRL SZ 2-0 L36IN ABSRB UD L36MM CT-1 1/2 CIR J945H

## (undated) DEVICE — GOWN,PRECEPT,XLNG/XXLARGE,STRL: Brand: MEDLINE

## (undated) DEVICE — DUAL CUT SAGITTAL BLADE

## (undated) DEVICE — Z DISCONTINUED USE 2272117 DRAPE SURG 3 QTR N INVASIVE 2 LAYR DISP

## (undated) DEVICE — TOTAL TRAY, 16FR 10ML SIL FOLEY, URN: Brand: MEDLINE

## (undated) DEVICE — SOLUTION IV IRRIG POUR BRL 0.9% SODIUM CHL 2F7124